# Patient Record
Sex: MALE | Race: BLACK OR AFRICAN AMERICAN | NOT HISPANIC OR LATINO | Employment: FULL TIME | URBAN - METROPOLITAN AREA
[De-identification: names, ages, dates, MRNs, and addresses within clinical notes are randomized per-mention and may not be internally consistent; named-entity substitution may affect disease eponyms.]

---

## 2023-10-04 ENCOUNTER — LAB (OUTPATIENT)
Dept: LAB | Facility: LAB | Age: 48
End: 2023-10-04
Payer: COMMERCIAL

## 2023-10-04 DIAGNOSIS — Z00.00 ENCOUNTER FOR GENERAL ADULT MEDICAL EXAMINATION WITHOUT ABNORMAL FINDINGS: Primary | ICD-10-CM

## 2023-10-04 LAB
25(OH)D3 SERPL-MCNC: 46 NG/ML (ref 30–100)
ALBUMIN SERPL BCP-MCNC: 3.3 G/DL (ref 3.4–5)
ALP SERPL-CCNC: 120 U/L (ref 33–120)
ALT SERPL W P-5'-P-CCNC: 52 U/L (ref 10–52)
ANION GAP SERPL CALC-SCNC: 14 MMOL/L (ref 10–20)
AST SERPL W P-5'-P-CCNC: 91 U/L (ref 9–39)
BASOPHILS # BLD AUTO: 0.04 X10*3/UL (ref 0–0.1)
BASOPHILS NFR BLD AUTO: 1.3 %
BILIRUB SERPL-MCNC: 1.1 MG/DL (ref 0–1.2)
BUN SERPL-MCNC: 10 MG/DL (ref 6–23)
CALCIUM SERPL-MCNC: 8.9 MG/DL (ref 8.6–10.6)
CHLORIDE SERPL-SCNC: 105 MMOL/L (ref 98–107)
CHOLEST SERPL-MCNC: 351 MG/DL (ref 0–199)
CHOLESTEROL/HDL RATIO: 21
CO2 SERPL-SCNC: 26 MMOL/L (ref 21–32)
CREAT SERPL-MCNC: 0.82 MG/DL (ref 0.5–1.3)
EOSINOPHIL # BLD AUTO: 0.04 X10*3/UL (ref 0–0.7)
EOSINOPHIL NFR BLD AUTO: 1.3 %
ERYTHROCYTE [DISTWIDTH] IN BLOOD BY AUTOMATED COUNT: 14.8 % (ref 11.5–14.5)
EST. AVERAGE GLUCOSE BLD GHB EST-MCNC: 91 MG/DL
GFR SERPL CREATININE-BSD FRML MDRD: >90 ML/MIN/1.73M*2
GLUCOSE SERPL-MCNC: 116 MG/DL (ref 74–99)
HBA1C MFR BLD: 4.8 %
HCT VFR BLD AUTO: 34.7 % (ref 41–52)
HDLC SERPL-MCNC: 16.7 MG/DL
HGB BLD-MCNC: 11.7 G/DL (ref 13.5–17.5)
IMM GRANULOCYTES # BLD AUTO: 0.01 X10*3/UL (ref 0–0.7)
IMM GRANULOCYTES NFR BLD AUTO: 0.3 % (ref 0–0.9)
LDLC SERPL CALC-MCNC: ABNORMAL MG/DL
LYMPHOCYTES # BLD AUTO: 1.2 X10*3/UL (ref 1.2–4.8)
LYMPHOCYTES NFR BLD AUTO: 40 %
MCH RBC QN AUTO: 35.1 PG (ref 26–34)
MCHC RBC AUTO-ENTMCNC: 33.7 G/DL (ref 32–36)
MCV RBC AUTO: 104 FL (ref 80–100)
MONOCYTES # BLD AUTO: 0.24 X10*3/UL (ref 0.1–1)
MONOCYTES NFR BLD AUTO: 8 %
NEUTROPHILS # BLD AUTO: 1.47 X10*3/UL (ref 1.2–7.7)
NEUTROPHILS NFR BLD AUTO: 49.1 %
NON HDL CHOLESTEROL: 334 MG/DL (ref 0–149)
NRBC BLD-RTO: 0.7 /100 WBCS (ref 0–0)
PLATELET # BLD AUTO: 108 X10*3/UL (ref 150–450)
PMV BLD AUTO: 11.7 FL (ref 7.5–11.5)
POTASSIUM SERPL-SCNC: 4.1 MMOL/L (ref 3.5–5.3)
PROT SERPL-MCNC: 6.8 G/DL (ref 6.4–8.2)
PSA SERPL-MCNC: 2.48 NG/ML
RBC # BLD AUTO: 3.33 X10*6/UL (ref 4.5–5.9)
SODIUM SERPL-SCNC: 141 MMOL/L (ref 136–145)
TRIGL SERPL-MCNC: 1623 MG/DL (ref 0–149)
TSH SERPL-ACNC: 1.97 MIU/L (ref 0.44–3.98)
VIT B12 SERPL-MCNC: 537 PG/ML (ref 211–911)
VLDL: ABNORMAL
WBC # BLD AUTO: 3 X10*3/UL (ref 4.4–11.3)

## 2023-10-04 PROCEDURE — 82306 VITAMIN D 25 HYDROXY: CPT

## 2023-10-04 PROCEDURE — 83036 HEMOGLOBIN GLYCOSYLATED A1C: CPT

## 2023-10-04 PROCEDURE — 84153 ASSAY OF PSA TOTAL: CPT

## 2023-10-04 PROCEDURE — 80061 LIPID PANEL: CPT

## 2023-10-04 PROCEDURE — 82607 VITAMIN B-12: CPT

## 2023-10-04 PROCEDURE — 36415 COLL VENOUS BLD VENIPUNCTURE: CPT

## 2023-10-04 PROCEDURE — 80050 GENERAL HEALTH PANEL: CPT

## 2024-04-10 ENCOUNTER — HOSPITAL ENCOUNTER (OUTPATIENT)
Dept: RADIOLOGY | Facility: HOSPITAL | Age: 49
Discharge: HOME | End: 2024-04-10
Payer: COMMERCIAL

## 2024-04-10 DIAGNOSIS — R19.00 INTRA-ABDOMINAL AND PELVIC SWELLING, MASS AND LUMP, UNSPECIFIED SITE: ICD-10-CM

## 2024-04-10 PROCEDURE — 76700 US EXAM ABDOM COMPLETE: CPT

## 2024-04-10 PROCEDURE — 76700 US EXAM ABDOM COMPLETE: CPT | Performed by: RADIOLOGY

## 2024-10-06 ENCOUNTER — APPOINTMENT (OUTPATIENT)
Dept: RADIOLOGY | Facility: HOSPITAL | Age: 49
End: 2024-10-06
Payer: COMMERCIAL

## 2024-10-06 ENCOUNTER — HOSPITAL ENCOUNTER (INPATIENT)
Facility: HOSPITAL | Age: 49
LOS: 6 days | Discharge: HOME | End: 2024-10-12
Attending: STUDENT IN AN ORGANIZED HEALTH CARE EDUCATION/TRAINING PROGRAM | Admitting: SURGERY
Payer: COMMERCIAL

## 2024-10-06 DIAGNOSIS — J18.9 PNEUMONIA OF RIGHT UPPER LOBE DUE TO INFECTIOUS ORGANISM: ICD-10-CM

## 2024-10-06 DIAGNOSIS — A41.9 SEPSIS (MULTI): Primary | ICD-10-CM

## 2024-10-06 LAB
ALBUMIN SERPL BCP-MCNC: 3.2 G/DL (ref 3.4–5)
ALP SERPL-CCNC: 105 U/L (ref 33–120)
ALT SERPL W P-5'-P-CCNC: 16 U/L (ref 10–52)
ANION GAP SERPL CALCULATED.3IONS-SCNC: 20 MMOL/L (ref 10–20)
APPEARANCE UR: ABNORMAL
AST SERPL W P-5'-P-CCNC: 43 U/L (ref 9–39)
BASOPHILS # BLD MANUAL: 0.02 X10*3/UL (ref 0–0.1)
BASOPHILS NFR BLD MANUAL: 1 %
BILIRUB SERPL-MCNC: 1.6 MG/DL (ref 0–1.2)
BILIRUB UR STRIP.AUTO-MCNC: NEGATIVE MG/DL
BUN SERPL-MCNC: 12 MG/DL (ref 6–23)
CALCIUM SERPL-MCNC: 8.3 MG/DL (ref 8.6–10.3)
CHLORIDE SERPL-SCNC: 102 MMOL/L (ref 98–107)
CO2 SERPL-SCNC: 16 MMOL/L (ref 21–32)
COLOR UR: YELLOW
CREAT SERPL-MCNC: 0.97 MG/DL (ref 0.5–1.3)
DOHLE BOD BLD QL SMEAR: PRESENT
EGFRCR SERPLBLD CKD-EPI 2021: >90 ML/MIN/1.73M*2
EOSINOPHIL # BLD MANUAL: 0 X10*3/UL (ref 0–0.7)
EOSINOPHIL NFR BLD MANUAL: 0 %
ERYTHROCYTE [DISTWIDTH] IN BLOOD BY AUTOMATED COUNT: 17.1 % (ref 11.5–14.5)
FLUAV RNA RESP QL NAA+PROBE: NOT DETECTED
FLUBV RNA RESP QL NAA+PROBE: NOT DETECTED
GLUCOSE SERPL-MCNC: 60 MG/DL (ref 74–99)
GLUCOSE UR STRIP.AUTO-MCNC: NORMAL MG/DL
HCT VFR BLD AUTO: 34.2 % (ref 41–52)
HGB BLD-MCNC: 11.2 G/DL (ref 13.5–17.5)
IMM GRANULOCYTES # BLD AUTO: 0.01 X10*3/UL (ref 0–0.7)
IMM GRANULOCYTES NFR BLD AUTO: 0.5 % (ref 0–0.9)
KETONES UR STRIP.AUTO-MCNC: NEGATIVE MG/DL
LACTATE SERPL-SCNC: 5.3 MMOL/L (ref 0.4–2)
LACTATE SERPL-SCNC: 8 MMOL/L (ref 0.4–2)
LEUKOCYTE ESTERASE UR QL STRIP.AUTO: NEGATIVE
LYMPHOCYTES # BLD MANUAL: 0.4 X10*3/UL (ref 1.2–4.8)
LYMPHOCYTES NFR BLD MANUAL: 19 %
MCH RBC QN AUTO: 29.1 PG (ref 26–34)
MCHC RBC AUTO-ENTMCNC: 32.7 G/DL (ref 32–36)
MCV RBC AUTO: 89 FL (ref 80–100)
METAMYELOCYTES # BLD MANUAL: 0.02 X10*3/UL
METAMYELOCYTES NFR BLD MANUAL: 1 %
MONOCYTES # BLD MANUAL: 0.11 X10*3/UL (ref 0.1–1)
MONOCYTES NFR BLD MANUAL: 5 %
MUCOUS THREADS #/AREA URNS AUTO: NORMAL /LPF
MYELOCYTES # BLD MANUAL: 0.02 X10*3/UL
MYELOCYTES NFR BLD MANUAL: 1 %
NEUTROPHILS # BLD MANUAL: 1.53 X10*3/UL (ref 1.2–7.7)
NEUTS BAND # BLD MANUAL: 0.02 X10*3/UL (ref 0–0.7)
NEUTS BAND NFR BLD MANUAL: 1 %
NEUTS SEG # BLD MANUAL: 1.51 X10*3/UL (ref 1.2–7)
NEUTS SEG NFR BLD MANUAL: 72 %
NITRITE UR QL STRIP.AUTO: NEGATIVE
NRBC BLD-RTO: 0 /100 WBCS (ref 0–0)
PH UR STRIP.AUTO: 5.5 [PH]
PLATELET # BLD AUTO: 71 X10*3/UL (ref 150–450)
POLYCHROMASIA BLD QL SMEAR: ABNORMAL
POTASSIUM SERPL-SCNC: 4 MMOL/L (ref 3.5–5.3)
PROT SERPL-MCNC: 7.9 G/DL (ref 6.4–8.2)
PROT UR STRIP.AUTO-MCNC: ABNORMAL MG/DL
RBC # BLD AUTO: 3.85 X10*6/UL (ref 4.5–5.9)
RBC # UR STRIP.AUTO: ABNORMAL /UL
RBC #/AREA URNS AUTO: NORMAL /HPF
RBC MORPH BLD: ABNORMAL
SARS-COV-2 RNA RESP QL NAA+PROBE: NOT DETECTED
SODIUM SERPL-SCNC: 134 MMOL/L (ref 136–145)
SP GR UR STRIP.AUTO: 1.02
TOTAL CELLS COUNTED BLD: 100
UROBILINOGEN UR STRIP.AUTO-MCNC: NORMAL MG/DL
WBC # BLD AUTO: 2.1 X10*3/UL (ref 4.4–11.3)
WBC #/AREA URNS AUTO: NORMAL /HPF

## 2024-10-06 PROCEDURE — 85007 BL SMEAR W/DIFF WBC COUNT: CPT | Performed by: PHYSICIAN ASSISTANT

## 2024-10-06 PROCEDURE — 96375 TX/PRO/DX INJ NEW DRUG ADDON: CPT

## 2024-10-06 PROCEDURE — 87040 BLOOD CULTURE FOR BACTERIA: CPT | Mod: WESLAB | Performed by: PHYSICIAN ASSISTANT

## 2024-10-06 PROCEDURE — 71046 X-RAY EXAM CHEST 2 VIEWS: CPT

## 2024-10-06 PROCEDURE — 96367 TX/PROPH/DG ADDL SEQ IV INF: CPT

## 2024-10-06 PROCEDURE — 2500000001 HC RX 250 WO HCPCS SELF ADMINISTERED DRUGS (ALT 637 FOR MEDICARE OP): Performed by: STUDENT IN AN ORGANIZED HEALTH CARE EDUCATION/TRAINING PROGRAM

## 2024-10-06 PROCEDURE — 83605 ASSAY OF LACTIC ACID: CPT | Performed by: PHYSICIAN ASSISTANT

## 2024-10-06 PROCEDURE — 2500000004 HC RX 250 GENERAL PHARMACY W/ HCPCS (ALT 636 FOR OP/ED)

## 2024-10-06 PROCEDURE — 84075 ASSAY ALKALINE PHOSPHATASE: CPT | Performed by: PHYSICIAN ASSISTANT

## 2024-10-06 PROCEDURE — 2500000004 HC RX 250 GENERAL PHARMACY W/ HCPCS (ALT 636 FOR OP/ED): Performed by: PHYSICIAN ASSISTANT

## 2024-10-06 PROCEDURE — 87636 SARSCOV2 & INF A&B AMP PRB: CPT | Performed by: STUDENT IN AN ORGANIZED HEALTH CARE EDUCATION/TRAINING PROGRAM

## 2024-10-06 PROCEDURE — 2060000001 HC INTERMEDIATE ICU ROOM DAILY

## 2024-10-06 PROCEDURE — 96361 HYDRATE IV INFUSION ADD-ON: CPT

## 2024-10-06 PROCEDURE — 96365 THER/PROPH/DIAG IV INF INIT: CPT

## 2024-10-06 PROCEDURE — 71046 X-RAY EXAM CHEST 2 VIEWS: CPT | Performed by: RADIOLOGY

## 2024-10-06 PROCEDURE — 36415 COLL VENOUS BLD VENIPUNCTURE: CPT | Performed by: PHYSICIAN ASSISTANT

## 2024-10-06 PROCEDURE — 85027 COMPLETE CBC AUTOMATED: CPT | Performed by: PHYSICIAN ASSISTANT

## 2024-10-06 PROCEDURE — 81001 URINALYSIS AUTO W/SCOPE: CPT | Performed by: PHYSICIAN ASSISTANT

## 2024-10-06 PROCEDURE — 99291 CRITICAL CARE FIRST HOUR: CPT

## 2024-10-06 RX ORDER — ACETAMINOPHEN 500 MG
1000 TABLET ORAL ONCE
Status: DISCONTINUED | OUTPATIENT
Start: 2024-10-06 | End: 2024-10-07

## 2024-10-06 RX ORDER — ACETAMINOPHEN 325 MG/1
975 TABLET ORAL ONCE
Status: COMPLETED | OUTPATIENT
Start: 2024-10-06 | End: 2024-10-06

## 2024-10-06 RX ORDER — CEFTRIAXONE 2 G/50ML
2 INJECTION, SOLUTION INTRAVENOUS ONCE
Status: COMPLETED | OUTPATIENT
Start: 2024-10-06 | End: 2024-10-06

## 2024-10-06 RX ORDER — KETOROLAC TROMETHAMINE 30 MG/ML
15 INJECTION, SOLUTION INTRAMUSCULAR; INTRAVENOUS ONCE
Status: COMPLETED | OUTPATIENT
Start: 2024-10-06 | End: 2024-10-06

## 2024-10-06 ASSESSMENT — COLUMBIA-SUICIDE SEVERITY RATING SCALE - C-SSRS
2. HAVE YOU ACTUALLY HAD ANY THOUGHTS OF KILLING YOURSELF?: NO
1. IN THE PAST MONTH, HAVE YOU WISHED YOU WERE DEAD OR WISHED YOU COULD GO TO SLEEP AND NOT WAKE UP?: NO
6. HAVE YOU EVER DONE ANYTHING, STARTED TO DO ANYTHING, OR PREPARED TO DO ANYTHING TO END YOUR LIFE?: NO

## 2024-10-06 ASSESSMENT — PAIN SCALES - GENERAL
PAINLEVEL_OUTOF10: 8
PAINLEVEL_OUTOF10: 8

## 2024-10-06 ASSESSMENT — PAIN DESCRIPTION - LOCATION: LOCATION: GENERALIZED

## 2024-10-07 ENCOUNTER — APPOINTMENT (OUTPATIENT)
Dept: RADIOLOGY | Facility: HOSPITAL | Age: 49
End: 2024-10-07
Payer: COMMERCIAL

## 2024-10-07 LAB
ALBUMIN SERPL BCP-MCNC: 2.8 G/DL (ref 3.4–5)
ALP SERPL-CCNC: 67 U/L (ref 33–120)
ALT SERPL W P-5'-P-CCNC: 16 U/L (ref 10–52)
ANION GAP BLDV CALCULATED.4IONS-SCNC: 10 MMOL/L (ref 10–25)
ANION GAP BLDV CALCULATED.4IONS-SCNC: 13 MMOL/L (ref 10–25)
ANION GAP BLDV CALCULATED.4IONS-SCNC: 14 MMOL/L (ref 10–25)
ANION GAP SERPL CALCULATED.3IONS-SCNC: 14 MMOL/L (ref 10–20)
ARTERIAL PATENCY WRIST A: POSITIVE
AST SERPL W P-5'-P-CCNC: 43 U/L (ref 9–39)
BACTERIA SPEC RESP CULT: ABNORMAL
BASE EXCESS BLDA CALC-SCNC: -7 MMOL/L (ref -2–3)
BASE EXCESS BLDV CALC-SCNC: -1.3 MMOL/L (ref -2–3)
BASE EXCESS BLDV CALC-SCNC: -3.7 MMOL/L (ref -2–3)
BASE EXCESS BLDV CALC-SCNC: -6.5 MMOL/L (ref -2–3)
BASOPHILS # BLD MANUAL: 0.09 X10*3/UL (ref 0–0.1)
BASOPHILS NFR BLD MANUAL: 1 %
BILIRUB SERPL-MCNC: 1.5 MG/DL (ref 0–1.2)
BODY TEMPERATURE: 37 DEGREES CELSIUS
BUN SERPL-MCNC: 15 MG/DL (ref 6–23)
CA-I BLDV-SCNC: 1.07 MMOL/L (ref 1.1–1.33)
CA-I BLDV-SCNC: 1.12 MMOL/L (ref 1.1–1.33)
CA-I BLDV-SCNC: 1.14 MMOL/L (ref 1.1–1.33)
CALCIUM SERPL-MCNC: 7.8 MG/DL (ref 8.6–10.3)
CARDIAC TROPONIN I PNL SERPL HS: 17 NG/L (ref 0–20)
CHLORIDE BLDV-SCNC: 103 MMOL/L (ref 98–107)
CHLORIDE SERPL-SCNC: 103 MMOL/L (ref 98–107)
CO2 SERPL-SCNC: 20 MMOL/L (ref 21–32)
COHGB MFR BLDA: ABNORMAL %
CREAT SERPL-MCNC: 1.13 MG/DL (ref 0.5–1.3)
DO-HGB MFR BLDA: ABNORMAL %
DOHLE BOD BLD QL SMEAR: PRESENT
EGFRCR SERPLBLD CKD-EPI 2021: 80 ML/MIN/1.73M*2
EOSINOPHIL # BLD MANUAL: 0 X10*3/UL (ref 0–0.7)
EOSINOPHIL NFR BLD MANUAL: 0 %
ERYTHROCYTE [DISTWIDTH] IN BLOOD BY AUTOMATED COUNT: 16.9 % (ref 11.5–14.5)
GLUCOSE BLDV-MCNC: 127 MG/DL (ref 74–99)
GLUCOSE BLDV-MCNC: 128 MG/DL (ref 74–99)
GLUCOSE BLDV-MCNC: 142 MG/DL (ref 74–99)
GLUCOSE SERPL-MCNC: 131 MG/DL (ref 74–99)
GRAM STN SPEC: ABNORMAL
HCO3 BLDA-SCNC: 16.9 MMOL/L (ref 22–26)
HCO3 BLDV-SCNC: 17.1 MMOL/L (ref 22–26)
HCO3 BLDV-SCNC: 20.3 MMOL/L (ref 22–26)
HCO3 BLDV-SCNC: 22.6 MMOL/L (ref 22–26)
HCT VFR BLD AUTO: 31.9 % (ref 41–52)
HCT VFR BLD EST: 29 % (ref 41–52)
HCT VFR BLD EST: 31 % (ref 41–52)
HCT VFR BLD EST: 32 % (ref 41–52)
HGB BLD-MCNC: 10 G/DL (ref 13.5–17.5)
HGB BLDA-MCNC: 10.2 G/DL (ref 13.5–17.5)
HGB BLDV-MCNC: 10.3 G/DL (ref 13.5–17.5)
HGB BLDV-MCNC: 10.6 G/DL (ref 13.5–17.5)
HGB BLDV-MCNC: 9.8 G/DL (ref 13.5–17.5)
HOLD SPECIMEN: NORMAL
IMM GRANULOCYTES # BLD AUTO: 0.4 X10*3/UL (ref 0–0.7)
IMM GRANULOCYTES NFR BLD AUTO: 4.5 % (ref 0–0.9)
INHALED O2 CONCENTRATION: 0 %
INHALED O2 CONCENTRATION: 21 %
INHALED O2 CONCENTRATION: 21 %
INHALED O2 CONCENTRATION: 28 %
LACTATE BLDV-SCNC: 4.3 MMOL/L (ref 0.4–2)
LACTATE BLDV-SCNC: 6.7 MMOL/L (ref 0.4–2)
LACTATE SERPL-SCNC: 7.5 MMOL/L (ref 0.4–2)
LYMPHOCYTES # BLD MANUAL: 0.8 X10*3/UL (ref 1.2–4.8)
LYMPHOCYTES NFR BLD MANUAL: 9 %
MAGNESIUM SERPL-MCNC: 0.98 MG/DL (ref 1.6–2.4)
MAGNESIUM SERPL-MCNC: 2.19 MG/DL (ref 1.6–2.4)
MCH RBC QN AUTO: 28.7 PG (ref 26–34)
MCHC RBC AUTO-ENTMCNC: 31.3 G/DL (ref 32–36)
MCV RBC AUTO: 91 FL (ref 80–100)
METAMYELOCYTES # BLD MANUAL: 0.36 X10*3/UL
METAMYELOCYTES NFR BLD MANUAL: 4 %
METHGB MFR BLDA: ABNORMAL %
MONOCYTES # BLD MANUAL: 0.45 X10*3/UL (ref 0.1–1)
MONOCYTES NFR BLD MANUAL: 5 %
NEUTROPHILS # BLD MANUAL: 7.21 X10*3/UL (ref 1.2–7.7)
NEUTS BAND # BLD MANUAL: 2.23 X10*3/UL (ref 0–0.7)
NEUTS BAND NFR BLD MANUAL: 25 %
NEUTS SEG # BLD MANUAL: 4.98 X10*3/UL (ref 1.2–7)
NEUTS SEG NFR BLD MANUAL: 56 %
NRBC BLD-RTO: 0 /100 WBCS (ref 0–0)
OXYHGB MFR BLDA: 79.9 % (ref 94–98)
OXYHGB MFR BLDV: 71.1 % (ref 45–75)
OXYHGB MFR BLDV: 74.1 % (ref 45–75)
OXYHGB MFR BLDV: 86.6 % (ref 45–75)
PCO2 BLDA: 28 MM HG (ref 38–42)
PCO2 BLDV: 27 MM HG (ref 41–51)
PCO2 BLDV: 32 MM HG (ref 41–51)
PCO2 BLDV: 34 MM HG (ref 41–51)
PH BLDA: 7.39 PH (ref 7.38–7.42)
PH BLDV: 7.41 PH (ref 7.33–7.43)
PH BLDV: 7.41 PH (ref 7.33–7.43)
PH BLDV: 7.43 PH (ref 7.33–7.43)
PHOSPHATE SERPL-MCNC: 2.5 MG/DL (ref 2.5–4.9)
PLATELET # BLD AUTO: 56 X10*3/UL (ref 150–450)
PO2 BLDA: 52 MM HG (ref 85–95)
PO2 BLDV: 45 MM HG (ref 35–45)
PO2 BLDV: 48 MM HG (ref 35–45)
PO2 BLDV: 60 MM HG (ref 35–45)
POLYCHROMASIA BLD QL SMEAR: ABNORMAL
POTASSIUM BLDV-SCNC: 4.2 MMOL/L (ref 3.5–5.3)
POTASSIUM BLDV-SCNC: 4.2 MMOL/L (ref 3.5–5.3)
POTASSIUM BLDV-SCNC: 5.2 MMOL/L (ref 3.5–5.3)
POTASSIUM SERPL-SCNC: 4.7 MMOL/L (ref 3.5–5.3)
PROCALCITONIN SERPL-MCNC: 43.27 NG/ML
PROT SERPL-MCNC: 6.9 G/DL (ref 6.4–8.2)
RBC # BLD AUTO: 3.49 X10*6/UL (ref 4.5–5.9)
RBC MORPH BLD: ABNORMAL
SAO2 % BLDA: 82 % (ref 94–100)
SAO2 % BLDV: 73 % (ref 45–75)
SAO2 % BLDV: 76 % (ref 45–75)
SAO2 % BLDV: 89 % (ref 45–75)
SODIUM BLDV-SCNC: 130 MMOL/L (ref 136–145)
SODIUM BLDV-SCNC: 131 MMOL/L (ref 136–145)
SODIUM BLDV-SCNC: 131 MMOL/L (ref 136–145)
SODIUM SERPL-SCNC: 132 MMOL/L (ref 136–145)
SPECIMEN DRAWN FROM PATIENT: ABNORMAL
TOTAL CELLS COUNTED BLD: 100
WBC # BLD AUTO: 8.9 X10*3/UL (ref 4.4–11.3)

## 2024-10-07 PROCEDURE — 74176 CT ABD & PELVIS W/O CONTRAST: CPT

## 2024-10-07 PROCEDURE — 76705 ECHO EXAM OF ABDOMEN: CPT | Performed by: RADIOLOGY

## 2024-10-07 PROCEDURE — 85027 COMPLETE CBC AUTOMATED: CPT

## 2024-10-07 PROCEDURE — 99292 CRITICAL CARE ADDL 30 MIN: CPT

## 2024-10-07 PROCEDURE — 87205 SMEAR GRAM STAIN: CPT | Mod: WESLAB

## 2024-10-07 PROCEDURE — 36600 WITHDRAWAL OF ARTERIAL BLOOD: CPT

## 2024-10-07 PROCEDURE — 36415 COLL VENOUS BLD VENIPUNCTURE: CPT | Performed by: PHYSICIAN ASSISTANT

## 2024-10-07 PROCEDURE — 2500000004 HC RX 250 GENERAL PHARMACY W/ HCPCS (ALT 636 FOR OP/ED)

## 2024-10-07 PROCEDURE — 82375 ASSAY CARBOXYHB QUANT: CPT

## 2024-10-07 PROCEDURE — 84075 ASSAY ALKALINE PHOSPHATASE: CPT

## 2024-10-07 PROCEDURE — 82435 ASSAY OF BLOOD CHLORIDE: CPT

## 2024-10-07 PROCEDURE — 71275 CT ANGIOGRAPHY CHEST: CPT | Performed by: RADIOLOGY

## 2024-10-07 PROCEDURE — 83605 ASSAY OF LACTIC ACID: CPT

## 2024-10-07 PROCEDURE — 71275 CT ANGIOGRAPHY CHEST: CPT

## 2024-10-07 PROCEDURE — 2500000001 HC RX 250 WO HCPCS SELF ADMINISTERED DRUGS (ALT 637 FOR MEDICARE OP)

## 2024-10-07 PROCEDURE — 83735 ASSAY OF MAGNESIUM: CPT

## 2024-10-07 PROCEDURE — 83605 ASSAY OF LACTIC ACID: CPT | Performed by: PHYSICIAN ASSISTANT

## 2024-10-07 PROCEDURE — 99291 CRITICAL CARE FIRST HOUR: CPT

## 2024-10-07 PROCEDURE — 2060000001 HC INTERMEDIATE ICU ROOM DAILY

## 2024-10-07 PROCEDURE — 94760 N-INVAS EAR/PLS OXIMETRY 1: CPT

## 2024-10-07 PROCEDURE — 2500000002 HC RX 250 W HCPCS SELF ADMINISTERED DRUGS (ALT 637 FOR MEDICARE OP, ALT 636 FOR OP/ED)

## 2024-10-07 PROCEDURE — 84100 ASSAY OF PHOSPHORUS: CPT

## 2024-10-07 PROCEDURE — 74176 CT ABD & PELVIS W/O CONTRAST: CPT | Performed by: RADIOLOGY

## 2024-10-07 PROCEDURE — 2550000001 HC RX 255 CONTRASTS: Performed by: STUDENT IN AN ORGANIZED HEALTH CARE EDUCATION/TRAINING PROGRAM

## 2024-10-07 PROCEDURE — 85007 BL SMEAR W/DIFF WBC COUNT: CPT

## 2024-10-07 PROCEDURE — 76705 ECHO EXAM OF ABDOMEN: CPT

## 2024-10-07 PROCEDURE — 84484 ASSAY OF TROPONIN QUANT: CPT

## 2024-10-07 PROCEDURE — 36415 COLL VENOUS BLD VENIPUNCTURE: CPT

## 2024-10-07 PROCEDURE — 2500000004 HC RX 250 GENERAL PHARMACY W/ HCPCS (ALT 636 FOR OP/ED): Performed by: INTERNAL MEDICINE

## 2024-10-07 PROCEDURE — 84145 PROCALCITONIN (PCT): CPT | Mod: WESLAB

## 2024-10-07 RX ORDER — AZITHROMYCIN 250 MG/1
500 TABLET, FILM COATED ORAL
Status: DISCONTINUED | OUTPATIENT
Start: 2024-10-07 | End: 2024-10-07

## 2024-10-07 RX ORDER — THIAMINE HYDROCHLORIDE 100 MG/ML
100 INJECTION, SOLUTION INTRAMUSCULAR; INTRAVENOUS DAILY
Status: COMPLETED | OUTPATIENT
Start: 2024-10-07 | End: 2024-10-09

## 2024-10-07 RX ORDER — ONDANSETRON 4 MG/1
4 TABLET, ORALLY DISINTEGRATING ORAL EVERY 8 HOURS PRN
Status: DISCONTINUED | OUTPATIENT
Start: 2024-10-07 | End: 2024-10-07

## 2024-10-07 RX ORDER — LOPERAMIDE HYDROCHLORIDE 2 MG/1
2 CAPSULE ORAL 4 TIMES DAILY PRN
Status: DISCONTINUED | OUTPATIENT
Start: 2024-10-07 | End: 2024-10-12 | Stop reason: HOSPADM

## 2024-10-07 RX ORDER — ONDANSETRON HYDROCHLORIDE 2 MG/ML
4 INJECTION, SOLUTION INTRAVENOUS EVERY 8 HOURS PRN
Status: DISCONTINUED | OUTPATIENT
Start: 2024-10-07 | End: 2024-10-07

## 2024-10-07 RX ORDER — LANOLIN ALCOHOL/MO/W.PET/CERES
100 CREAM (GRAM) TOPICAL DAILY
Status: DISCONTINUED | OUTPATIENT
Start: 2024-10-10 | End: 2024-10-12 | Stop reason: HOSPADM

## 2024-10-07 RX ORDER — PHENOBARBITAL 32.4 MG/1
64.8 TABLET ORAL 3 TIMES DAILY
Status: COMPLETED | OUTPATIENT
Start: 2024-10-07 | End: 2024-10-08

## 2024-10-07 RX ORDER — CEFTRIAXONE 1 G/50ML
1 INJECTION, SOLUTION INTRAVENOUS EVERY 24 HOURS
Status: COMPLETED | OUTPATIENT
Start: 2024-10-08 | End: 2024-10-12

## 2024-10-07 RX ORDER — FOLIC ACID 1 MG/1
1 TABLET ORAL DAILY
Status: DISCONTINUED | OUTPATIENT
Start: 2024-10-07 | End: 2024-10-12 | Stop reason: HOSPADM

## 2024-10-07 RX ORDER — MULTIVIT-MIN/IRON FUM/FOLIC AC 7.5 MG-4
1 TABLET ORAL DAILY
Status: DISCONTINUED | OUTPATIENT
Start: 2024-10-07 | End: 2024-10-12 | Stop reason: HOSPADM

## 2024-10-07 RX ORDER — ACETAMINOPHEN 160 MG/5ML
650 SOLUTION ORAL EVERY 4 HOURS PRN
Status: DISCONTINUED | OUTPATIENT
Start: 2024-10-07 | End: 2024-10-07

## 2024-10-07 RX ORDER — ENOXAPARIN SODIUM 100 MG/ML
40 INJECTION SUBCUTANEOUS DAILY
Status: DISCONTINUED | OUTPATIENT
Start: 2024-10-07 | End: 2024-10-07

## 2024-10-07 RX ORDER — PHENOBARBITAL 32.4 MG/1
32.4 TABLET ORAL 3 TIMES DAILY
Status: DISPENSED | OUTPATIENT
Start: 2024-10-09 | End: 2024-10-11

## 2024-10-07 RX ORDER — MAGNESIUM SULFATE 4 G/50ML
4 INJECTION INTRAVENOUS ONCE
Status: COMPLETED | OUTPATIENT
Start: 2024-10-07 | End: 2024-10-07

## 2024-10-07 RX ORDER — ACETAMINOPHEN 325 MG/1
650 TABLET ORAL EVERY 4 HOURS PRN
Status: DISCONTINUED | OUTPATIENT
Start: 2024-10-07 | End: 2024-10-07

## 2024-10-07 RX ORDER — ACETAMINOPHEN 325 MG/1
650 TABLET ORAL EVERY 4 HOURS PRN
Status: DISCONTINUED | OUTPATIENT
Start: 2024-10-07 | End: 2024-10-12 | Stop reason: HOSPADM

## 2024-10-07 RX ORDER — ONDANSETRON HYDROCHLORIDE 2 MG/ML
4 INJECTION, SOLUTION INTRAVENOUS EVERY 4 HOURS PRN
Status: DISCONTINUED | OUTPATIENT
Start: 2024-10-07 | End: 2024-10-10 | Stop reason: SDUPTHER

## 2024-10-07 RX ORDER — ACETAMINOPHEN 160 MG/5ML
650 SOLUTION ORAL EVERY 6 HOURS PRN
Status: DISCONTINUED | OUTPATIENT
Start: 2024-10-07 | End: 2024-10-12 | Stop reason: HOSPADM

## 2024-10-07 RX ORDER — AZITHROMYCIN 250 MG/1
250 TABLET, FILM COATED ORAL DAILY
Status: DISCONTINUED | OUTPATIENT
Start: 2024-10-14 | End: 2024-10-07

## 2024-10-07 RX ORDER — SODIUM CHLORIDE 9 MG/ML
75 INJECTION, SOLUTION INTRAVENOUS CONTINUOUS
Status: DISCONTINUED | OUTPATIENT
Start: 2024-10-07 | End: 2024-10-12 | Stop reason: HOSPADM

## 2024-10-07 RX ORDER — CEFTRIAXONE 1 G/50ML
1 INJECTION, SOLUTION INTRAVENOUS EVERY 12 HOURS
Status: DISCONTINUED | OUTPATIENT
Start: 2024-10-07 | End: 2024-10-07

## 2024-10-07 RX ORDER — AZITHROMYCIN 500 MG/1
500 TABLET, FILM COATED ORAL
Status: COMPLETED | OUTPATIENT
Start: 2024-10-07 | End: 2024-10-12

## 2024-10-07 SDOH — ECONOMIC STABILITY: INCOME INSECURITY: IN THE LAST 12 MONTHS, WAS THERE A TIME WHEN YOU WERE NOT ABLE TO PAY THE MORTGAGE OR RENT ON TIME?: NO

## 2024-10-07 SDOH — HEALTH STABILITY: PHYSICAL HEALTH: ON AVERAGE, HOW MANY DAYS PER WEEK DO YOU ENGAGE IN MODERATE TO STRENUOUS EXERCISE (LIKE A BRISK WALK)?: 5 DAYS

## 2024-10-07 SDOH — SOCIAL STABILITY: SOCIAL INSECURITY: WITHIN THE LAST YEAR, HAVE YOU BEEN AFRAID OF YOUR PARTNER OR EX-PARTNER?: NO

## 2024-10-07 SDOH — ECONOMIC STABILITY: FOOD INSECURITY: WITHIN THE PAST 12 MONTHS, THE FOOD YOU BOUGHT JUST DIDN'T LAST AND YOU DIDN'T HAVE MONEY TO GET MORE.: NEVER TRUE

## 2024-10-07 SDOH — HEALTH STABILITY: PHYSICAL HEALTH: ON AVERAGE, HOW MANY MINUTES DO YOU ENGAGE IN EXERCISE AT THIS LEVEL?: 30 MIN

## 2024-10-07 SDOH — ECONOMIC STABILITY: FOOD INSECURITY: WITHIN THE PAST 12 MONTHS, YOU WORRIED THAT YOUR FOOD WOULD RUN OUT BEFORE YOU GOT THE MONEY TO BUY MORE.: NEVER TRUE

## 2024-10-07 SDOH — SOCIAL STABILITY: SOCIAL INSECURITY
WITHIN THE LAST YEAR, HAVE TO BEEN RAPED OR FORCED TO HAVE ANY KIND OF SEXUAL ACTIVITY BY YOUR PARTNER OR EX-PARTNER?: NO

## 2024-10-07 SDOH — ECONOMIC STABILITY: FOOD INSECURITY: WITHIN THE PAST 12 MONTHS, YOU WORRIED THAT YOUR FOOD WOULD RUN OUT BEFORE YOU GOT MONEY TO BUY MORE.: NEVER TRUE

## 2024-10-07 SDOH — ECONOMIC STABILITY: INCOME INSECURITY: IN THE PAST 12 MONTHS HAS THE ELECTRIC, GAS, OIL, OR WATER COMPANY THREATENED TO SHUT OFF SERVICES IN YOUR HOME?: NO

## 2024-10-07 SDOH — ECONOMIC STABILITY: HOUSING INSECURITY: AT ANY TIME IN THE PAST 12 MONTHS, WERE YOU HOMELESS OR LIVING IN A SHELTER (INCLUDING NOW)?: NO

## 2024-10-07 SDOH — HEALTH STABILITY: MENTAL HEALTH: HOW OFTEN DO YOU HAVE SIX OR MORE DRINKS ON ONE OCCASION?: DAILY OR ALMOST DAILY

## 2024-10-07 SDOH — SOCIAL STABILITY: SOCIAL INSECURITY: WITHIN THE LAST YEAR, HAVE YOU BEEN HUMILIATED OR EMOTIONALLY ABUSED IN OTHER WAYS BY YOUR PARTNER OR EX-PARTNER?: NO

## 2024-10-07 SDOH — SOCIAL STABILITY: SOCIAL INSECURITY: ARE YOU MARRIED, WIDOWED, DIVORCED, SEPARATED, NEVER MARRIED, OR LIVING WITH A PARTNER?: LIVING WITH PARTNER

## 2024-10-07 SDOH — ECONOMIC STABILITY: INCOME INSECURITY: HOW HARD IS IT FOR YOU TO PAY FOR THE VERY BASICS LIKE FOOD, HOUSING, MEDICAL CARE, AND HEATING?: NOT VERY HARD

## 2024-10-07 SDOH — SOCIAL STABILITY: SOCIAL NETWORK
DO YOU BELONG TO ANY CLUBS OR ORGANIZATIONS SUCH AS CHURCH GROUPS UNIONS, FRATERNAL OR ATHLETIC GROUPS, OR SCHOOL GROUPS?: NO

## 2024-10-07 SDOH — SOCIAL STABILITY: SOCIAL INSECURITY
WITHIN THE LAST YEAR, HAVE YOU BEEN KICKED, HIT, SLAPPED, OR OTHERWISE PHYSICALLY HURT BY YOUR PARTNER OR EX-PARTNER?: NO

## 2024-10-07 SDOH — SOCIAL STABILITY: SOCIAL INSECURITY
WITHIN THE LAST YEAR, HAVE YOU BEEN RAPED OR FORCED TO HAVE ANY KIND OF SEXUAL ACTIVITY BY YOUR PARTNER OR EX-PARTNER?: NO

## 2024-10-07 SDOH — HEALTH STABILITY: MENTAL HEALTH: HOW OFTEN DO YOU HAVE 6 OR MORE DRINKS ON ONE OCCASION?: DAILY OR ALMOST DAILY

## 2024-10-07 SDOH — ECONOMIC STABILITY: HOUSING INSECURITY: IN THE LAST 12 MONTHS, WAS THERE A TIME WHEN YOU WERE NOT ABLE TO PAY THE MORTGAGE OR RENT ON TIME?: NO

## 2024-10-07 SDOH — HEALTH STABILITY: PHYSICAL HEALTH
HOW OFTEN DO YOU NEED TO HAVE SOMEONE HELP YOU WHEN YOU READ INSTRUCTIONS, PAMPHLETS, OR OTHER WRITTEN MATERIAL FROM YOUR DOCTOR OR PHARMACY?: NEVER

## 2024-10-07 SDOH — SOCIAL STABILITY: SOCIAL NETWORK: HOW OFTEN DO YOU ATTEND CHURCH OR RELIGIOUS SERVICES?: NEVER

## 2024-10-07 SDOH — ECONOMIC STABILITY: INCOME INSECURITY: IN THE PAST 12 MONTHS, HAS THE ELECTRIC, GAS, OIL, OR WATER COMPANY THREATENED TO SHUT OFF SERVICE IN YOUR HOME?: NO

## 2024-10-07 SDOH — HEALTH STABILITY: MENTAL HEALTH: HOW MANY DRINKS CONTAINING ALCOHOL DO YOU HAVE ON A TYPICAL DAY WHEN YOU ARE DRINKING?: 7 TO 9

## 2024-10-07 SDOH — ECONOMIC STABILITY: INCOME INSECURITY: HOW HARD IS IT FOR YOU TO PAY FOR THE VERY BASICS LIKE FOOD, HOUSING, MEDICAL CARE, AND HEATING?: NOT HARD AT ALL

## 2024-10-07 SDOH — SOCIAL STABILITY: SOCIAL NETWORK: ARE YOU MARRIED, WIDOWED, DIVORCED, SEPARATED, NEVER MARRIED, OR LIVING WITH A PARTNER?: LIVING WITH PARTNER

## 2024-10-07 SDOH — HEALTH STABILITY: MENTAL HEALTH: HOW OFTEN DO YOU HAVE A DRINK CONTAINING ALCOHOL?: 4 OR MORE TIMES A WEEK

## 2024-10-07 SDOH — SOCIAL STABILITY: SOCIAL NETWORK: HOW OFTEN DO YOU ATTEND MEETINGS OF THE CLUBS OR ORGANIZATIONS YOU BELONG TO?: NEVER

## 2024-10-07 SDOH — HEALTH STABILITY: MENTAL HEALTH
DO YOU FEEL STRESS - TENSE, RESTLESS, NERVOUS, OR ANXIOUS, OR UNABLE TO SLEEP AT NIGHT BECAUSE YOUR MIND IS TROUBLED ALL THE TIME - THESE DAYS?: NOT AT ALL

## 2024-10-07 SDOH — SOCIAL STABILITY: SOCIAL INSECURITY: DO YOU FEEL ANYONE HAS EXPLOITED OR TAKEN ADVANTAGE OF YOU FINANCIALLY OR OF YOUR PERSONAL PROPERTY?: NO

## 2024-10-07 SDOH — SOCIAL STABILITY: SOCIAL INSECURITY: ABUSE: ADULT

## 2024-10-07 SDOH — ECONOMIC STABILITY: TRANSPORTATION INSECURITY
IN THE PAST 12 MONTHS, HAS LACK OF TRANSPORTATION KEPT YOU FROM MEETINGS, WORK, OR FROM GETTING THINGS NEEDED FOR DAILY LIVING?: NO

## 2024-10-07 SDOH — SOCIAL STABILITY: SOCIAL INSECURITY: ARE THERE ANY APPARENT SIGNS OF INJURIES/BEHAVIORS THAT COULD BE RELATED TO ABUSE/NEGLECT?: NO

## 2024-10-07 SDOH — SOCIAL STABILITY: SOCIAL NETWORK: HOW OFTEN DO YOU ATTENT MEETINGS OF THE CLUB OR ORGANIZATION YOU BELONG TO?: NEVER

## 2024-10-07 SDOH — SOCIAL STABILITY: SOCIAL NETWORK: IN A TYPICAL WEEK, HOW MANY TIMES DO YOU TALK ON THE PHONE WITH FAMILY, FRIENDS, OR NEIGHBORS?: TWICE A WEEK

## 2024-10-07 SDOH — HEALTH STABILITY: MENTAL HEALTH: HOW MANY STANDARD DRINKS CONTAINING ALCOHOL DO YOU HAVE ON A TYPICAL DAY?: 7 TO 9

## 2024-10-07 SDOH — HEALTH STABILITY: MENTAL HEALTH
STRESS IS WHEN SOMEONE FEELS TENSE, NERVOUS, ANXIOUS, OR CAN'T SLEEP AT NIGHT BECAUSE THEIR MIND IS TROUBLED. HOW STRESSED ARE YOU?: NOT AT ALL

## 2024-10-07 SDOH — ECONOMIC STABILITY: TRANSPORTATION INSECURITY: IN THE PAST 12 MONTHS, HAS LACK OF TRANSPORTATION KEPT YOU FROM MEDICAL APPOINTMENTS OR FROM GETTING MEDICATIONS?: NO

## 2024-10-07 SDOH — SOCIAL STABILITY: SOCIAL INSECURITY: ARE YOU OR HAVE YOU BEEN THREATENED OR ABUSED PHYSICALLY, EMOTIONALLY, OR SEXUALLY BY ANYONE?: NO

## 2024-10-07 SDOH — ECONOMIC STABILITY: FOOD INSECURITY: HOW HARD IS IT FOR YOU TO PAY FOR THE VERY BASICS LIKE FOOD, HOUSING, MEDICAL CARE, AND HEATING?: NOT VERY HARD

## 2024-10-07 SDOH — SOCIAL STABILITY: SOCIAL NETWORK
DO YOU BELONG TO ANY CLUBS OR ORGANIZATIONS SUCH AS CHURCH GROUPS, UNIONS, FRATERNAL OR ATHLETIC GROUPS, OR SCHOOL GROUPS?: NO

## 2024-10-07 SDOH — ECONOMIC STABILITY: HOUSING INSECURITY: IN THE PAST 12 MONTHS, HOW MANY TIMES HAVE YOU MOVED WHERE YOU WERE LIVING?: 0

## 2024-10-07 SDOH — SOCIAL STABILITY: SOCIAL INSECURITY: HAVE YOU HAD THOUGHTS OF HARMING ANYONE ELSE?: NO

## 2024-10-07 SDOH — SOCIAL STABILITY: SOCIAL INSECURITY: DO YOU FEEL UNSAFE GOING BACK TO THE PLACE WHERE YOU ARE LIVING?: NO

## 2024-10-07 SDOH — SOCIAL STABILITY: SOCIAL NETWORK: HOW OFTEN DO YOU GET TOGETHER WITH FRIENDS OR RELATIVES?: TWICE A WEEK

## 2024-10-07 SDOH — SOCIAL STABILITY: SOCIAL INSECURITY: DOES ANYONE TRY TO KEEP YOU FROM HAVING/CONTACTING OTHER FRIENDS OR DOING THINGS OUTSIDE YOUR HOME?: NO

## 2024-10-07 SDOH — SOCIAL STABILITY: SOCIAL INSECURITY: HAS ANYONE EVER THREATENED TO HURT YOUR FAMILY OR YOUR PETS?: NO

## 2024-10-07 SDOH — ECONOMIC STABILITY: TRANSPORTATION INSECURITY
IN THE PAST 12 MONTHS, HAS THE LACK OF TRANSPORTATION KEPT YOU FROM MEDICAL APPOINTMENTS OR FROM GETTING MEDICATIONS?: NO

## 2024-10-07 SDOH — ECONOMIC STABILITY: HOUSING INSECURITY: IN THE PAST 12 MONTHS, HOW MANY TIMES HAVE YOU MOVED WHERE YOU WERE LIVING?: 1

## 2024-10-07 SDOH — ECONOMIC STABILITY: FOOD INSECURITY: HOW HARD IS IT FOR YOU TO PAY FOR THE VERY BASICS LIKE FOOD, HOUSING, MEDICAL CARE, AND HEATING?: NOT HARD AT ALL

## 2024-10-07 SDOH — SOCIAL STABILITY: SOCIAL INSECURITY: WERE YOU ABLE TO COMPLETE ALL THE BEHAVIORAL HEALTH SCREENINGS?: YES

## 2024-10-07 ASSESSMENT — LIFESTYLE VARIABLES
VISUAL DISTURBANCES: NOT PRESENT
AGITATION: NORMAL ACTIVITY
NAUSEA AND VOMITING: NO NAUSEA AND NO VOMITING
HOW OFTEN DURING THE LAST YEAR HAVE YOU BEEN UNABLE TO REMEMBER WHAT HAPPENED THE NIGHT BEFORE BECAUSE YOU HAD BEEN DRINKING: NEVER
PAROXYSMAL SWEATS: NO SWEAT VISIBLE
BLOOD PRESSURE: 125/71
VISUAL DISTURBANCES: NOT PRESENT
TREMOR: NOT VISIBLE, BUT CAN BE FELT FINGERTIP TO FINGERTIP
HEADACHE, FULLNESS IN HEAD: NOT PRESENT
HOW OFTEN DO YOU HAVE A DRINK CONTAINING ALCOHOL: 4 OR MORE TIMES A WEEK
TOTAL SCORE: 1
ANXIETY: NO ANXIETY, AT EASE
AUDITORY DISTURBANCES: NOT PRESENT
AUDITORY DISTURBANCES: NOT PRESENT
AUDIT-C TOTAL SCORE: 10
PAROXYSMAL SWEATS: NO SWEAT VISIBLE
TREMOR: NOT VISIBLE, BUT CAN BE FELT FINGERTIP TO FINGERTIP
AGITATION: NORMAL ACTIVITY
BLOOD PRESSURE: 105/68
AGITATION: NORMAL ACTIVITY
HOW OFTEN DURING THE LAST YEAR HAVE YOU FAILED TO DO WHAT WAS NORMALLY EXPECTED FROM YOU BECAUSE OF DRINKING: NEVER
HEADACHE, FULLNESS IN HEAD: NOT PRESENT
HOW OFTEN DURING THE LAST YEAR HAVE YOU FOUND THAT YOU WERE NOT ABLE TO STOP DRINKING ONCE YOU HAD STARTED: NEVER
HEADACHE, FULLNESS IN HEAD: MILD
TOTAL SCORE: 1
HOW OFTEN DO YOU HAVE 6 OR MORE DRINKS ON ONE OCCASION: WEEKLY
HAVE YOU OR SOMEONE ELSE BEEN INJURED AS A RESULT OF YOUR DRINKING: NO
HEADACHE, FULLNESS IN HEAD: MILD
AGITATION: NORMAL ACTIVITY
TREMOR: NO TREMOR
AUDITORY DISTURBANCES: NOT PRESENT
SKIP TO QUESTIONS 9-10: 0
ORIENTATION AND CLOUDING OF SENSORIUM: ORIENTED AND CAN DO SERIAL ADDITIONS
PAROXYSMAL SWEATS: NO SWEAT VISIBLE
AUDITORY DISTURBANCES: NOT PRESENT
NAUSEA AND VOMITING: NO NAUSEA AND NO VOMITING
VISUAL DISTURBANCES: NOT PRESENT
PAROXYSMAL SWEATS: NO SWEAT VISIBLE
SKIP TO QUESTIONS 9-10: 0
AUDIT TOTAL SCORE: 10
AUDITORY DISTURBANCES: NOT PRESENT
NAUSEA AND VOMITING: NO NAUSEA AND NO VOMITING
NAUSEA AND VOMITING: NO NAUSEA AND NO VOMITING
ORIENTATION AND CLOUDING OF SENSORIUM: ORIENTED AND CAN DO SERIAL ADDITIONS
PAROXYSMAL SWEATS: NO SWEAT VISIBLE
TREMOR: NOT VISIBLE, BUT CAN BE FELT FINGERTIP TO FINGERTIP
AGITATION: NORMAL ACTIVITY
ANXIETY: NO ANXIETY, AT EASE
PAROXYSMAL SWEATS: NO SWEAT VISIBLE
TREMOR: 2
AUDIT-C TOTAL SCORE: 10
AUDIT TOTAL SCORE: 0
VISUAL DISTURBANCES: NOT PRESENT
ANXIETY: NO ANXIETY, AT EASE
HOW OFTEN DURING THE LAST YEAR HAVE YOU HAD A FEELING OF GUILT OR REMORSE AFTER DRINKING: NEVER
HOW MANY STANDARD DRINKS CONTAINING ALCOHOL DO YOU HAVE ON A TYPICAL DAY: 7 TO 9
HEADACHE, FULLNESS IN HEAD: NOT PRESENT
TOTAL SCORE: 1
TOTAL SCORE: 4
NAUSEA AND VOMITING: NO NAUSEA AND NO VOMITING
AUDITORY DISTURBANCES: NOT PRESENT
VISUAL DISTURBANCES: NOT PRESENT
AUDITORY DISTURBANCES: NOT PRESENT
ORIENTATION AND CLOUDING OF SENSORIUM: ORIENTED AND CAN DO SERIAL ADDITIONS
VISUAL DISTURBANCES: NOT PRESENT
ANXIETY: NO ANXIETY, AT EASE
ANXIETY: NO ANXIETY, AT EASE
ORIENTATION AND CLOUDING OF SENSORIUM: ORIENTED AND CAN DO SERIAL ADDITIONS
ANXIETY: NO ANXIETY, AT EASE
ORIENTATION AND CLOUDING OF SENSORIUM: ORIENTED AND CAN DO SERIAL ADDITIONS
ANXIETY: NO ANXIETY, AT EASE
AGITATION: NORMAL ACTIVITY
AGITATION: NORMAL ACTIVITY
HEADACHE, FULLNESS IN HEAD: NOT PRESENT
HEADACHE, FULLNESS IN HEAD: MILD
PAROXYSMAL SWEATS: NO SWEAT VISIBLE
AUDIT-C TOTAL SCORE: 11
NAUSEA AND VOMITING: NO NAUSEA AND NO VOMITING
HOW OFTEN DURING THE LAST YEAR HAVE YOU NEEDED AN ALCOHOLIC DRINK FIRST THING IN THE MORNING TO GET YOURSELF GOING AFTER A NIGHT OF HEAVY DRINKING: NEVER
ORIENTATION AND CLOUDING OF SENSORIUM: ORIENTED AND CAN DO SERIAL ADDITIONS
HAS A RELATIVE, FRIEND, DOCTOR, OR ANOTHER HEALTH PROFESSIONAL EXPRESSED CONCERN ABOUT YOUR DRINKING OR SUGGESTED YOU CUT DOWN: NO
TOTAL SCORE: 1
VISUAL DISTURBANCES: NOT PRESENT
TREMOR: NOT VISIBLE, BUT CAN BE FELT FINGERTIP TO FINGERTIP
NAUSEA AND VOMITING: NO NAUSEA AND NO VOMITING
TOTAL SCORE: 2
TREMOR: NO TREMOR

## 2024-10-07 ASSESSMENT — PATIENT HEALTH QUESTIONNAIRE - PHQ9
1. LITTLE INTEREST OR PLEASURE IN DOING THINGS: NOT AT ALL
2. FEELING DOWN, DEPRESSED OR HOPELESS: NOT AT ALL
SUM OF ALL RESPONSES TO PHQ9 QUESTIONS 1 & 2: 0

## 2024-10-07 ASSESSMENT — COGNITIVE AND FUNCTIONAL STATUS - GENERAL
MOBILITY SCORE: 24
MOBILITY SCORE: 24
DAILY ACTIVITIY SCORE: 24
PATIENT BASELINE BEDBOUND: NO

## 2024-10-07 ASSESSMENT — ACTIVITIES OF DAILY LIVING (ADL)
DRESSING YOURSELF: INDEPENDENT
WALKS IN HOME: INDEPENDENT
HEARING - RIGHT EAR: FUNCTIONAL
JUDGMENT_ADEQUATE_SAFELY_COMPLETE_DAILY_ACTIVITIES: YES
BATHING: INDEPENDENT
GROOMING: INDEPENDENT
TOILETING: INDEPENDENT
LACK_OF_TRANSPORTATION: NO
LACK_OF_TRANSPORTATION: NO
PATIENT'S MEMORY ADEQUATE TO SAFELY COMPLETE DAILY ACTIVITIES?: YES
HEARING - LEFT EAR: FUNCTIONAL
ADEQUATE_TO_COMPLETE_ADL: YES
LACK_OF_TRANSPORTATION: NO
FEEDING YOURSELF: INDEPENDENT
LACK_OF_TRANSPORTATION: NO

## 2024-10-07 ASSESSMENT — PAIN - FUNCTIONAL ASSESSMENT
PAIN_FUNCTIONAL_ASSESSMENT: 0-10

## 2024-10-07 ASSESSMENT — PAIN SCALES - GENERAL
PAINLEVEL_OUTOF10: 0 - NO PAIN
PAINLEVEL_OUTOF10: 3
PAINLEVEL_OUTOF10: 0 - NO PAIN
PAINLEVEL_OUTOF10: 0 - NO PAIN

## 2024-10-07 ASSESSMENT — PAIN DESCRIPTION - LOCATION: LOCATION: HEAD

## 2024-10-07 ASSESSMENT — PAIN DESCRIPTION - ORIENTATION: ORIENTATION: RIGHT;LEFT

## 2024-10-07 NOTE — PROGRESS NOTES
TCC met with patient. Assessment complete. Patient lives with his significant other in an apartment. Patient is independent. Patient drives and is able to shop, cook and clean. Patient treated for depression. Patient smokes about 1/2 PPD. Patient drinks 6 beers and 6 shots almost daily. Patient follows Dr. RADHA Simon. Patient fills prescriptions at [facility]. At this time the discharge plan is for patient to return home with no skilled services. Will follow.      10/07/24 1413   Physical Activity   On average, how many days per week do you engage in moderate to strenuous exercise (like a brisk walk)? 5 days   On average, how many minutes do you engage in exercise at this level? 30 min   Financial Resource Strain   How hard is it for you to pay for the very basics like food, housing, medical care, and heating? Not very   Housing Stability   In the last 12 months, was there a time when you were not able to pay the mortgage or rent on time? N   In the past 12 months, how many times have you moved where you were living? 0   At any time in the past 12 months, were you homeless or living in a shelter (including now)? N   Transportation Needs   In the past 12 months, has lack of transportation kept you from medical appointments or from getting medications? no   In the past 12 months, has lack of transportation kept you from meetings, work, or from getting things needed for daily living? No   Food Insecurity   Within the past 12 months, you worried that your food would run out before you got the money to buy more. Never true   Within the past 12 months, the food you bought just didn't last and you didn't have money to get more. Never true   Stress   Do you feel stress - tense, restless, nervous, or anxious, or unable to sleep at night because your mind is troubled all the time - these days? Not at all   Social Connections   In a typical week, how many times do you talk on the phone with family, friends, or neighbors?  Twice a week   How often do you get together with friends or relatives? Twice   How often do you attend Confucianist or Anabaptism services? Never   Do you belong to any clubs or organizations such as Confucianist groups, unions, fraternal or athletic groups, or school groups? No   How often do you attend meetings of the clubs or organizations you belong to? Never   Are you , , , , never , or living with a partner? Living with   Intimate Partner Violence   Within the last year, have you been afraid of your partner or ex-partner? No   Within the last year, have you been humiliated or emotionally abused in other ways by your partner or ex-partner? No   Within the last year, have you been kicked, hit, slapped, or otherwise physically hurt by your partner or ex-partner? No   Within the last year, have you been raped or forced to have any kind of sexual activity by your partner or ex-partner? No   Alcohol Use   Q1: How often do you have a drink containing alcohol? 4 or more ti   Q2: How many drinks containing alcohol do you have on a typical day when you are drinking? 7 to 9   Q3: How often do you have six or more drinks on one occasion? Daily   Utilities   In the past 12 months has the electric, gas, oil, or water company threatened to shut off services in your home? No   Health Literacy   How often do you need to have someone help you when you read instructions, pamphlets, or other written material from your doctor or pharmacy? Never

## 2024-10-07 NOTE — SIGNIFICANT EVENT
BG RESULTS;   pH 7.39  pCO2 28  pO2 52  Na+ 131  K+ 4.3  Cl+ 102  Ca++ 1.05  Glu 124  Lac 6.7     tHb 10.2  O2Hb 79.9  sO2 81.8    BE(B) -7.0  AG 16  O2ct 11.5  HCO3 16.9         IT WAS DONE ON CPAP +8 AND 60% FiO2@

## 2024-10-07 NOTE — CARE PLAN
The patient's goals for the shift include  to feel better    The clinical goals for the shift include remain hemodynamically stable      Problem: Pain - Adult  Goal: Verbalizes/displays adequate comfort level or baseline comfort level  Outcome: Progressing     Problem: Safety - Adult  Goal: Free from fall injury  Outcome: Progressing     Problem: Discharge Planning  Goal: Discharge to home or other facility with appropriate resources  Outcome: Progressing     Problem: Chronic Conditions and Co-morbidities  Goal: Patient's chronic conditions and co-morbidity symptoms are monitored and maintained or improved  Outcome: Progressing     Problem: Pain  Goal: Takes deep breaths with improved pain control throughout the shift  Outcome: Progressing  Goal: Turns in bed with improved pain control throughout the shift  Outcome: Progressing  Goal: Walks with improved pain control throughout the shift  Outcome: Progressing  Goal: Performs ADL's with improved pain control throughout shift  Outcome: Progressing  Goal: Participates in PT with improved pain control throughout the shift  Outcome: Progressing  Goal: Free from opioid side effects throughout the shift  Outcome: Progressing  Goal: Free from acute confusion related to pain meds throughout the shift  Outcome: Progressing     Problem: Skin  Goal: Decreased wound size/increased tissue granulation at next dressing change  Outcome: Progressing  Flowsheets (Taken 10/7/2024 0340)  Decreased wound size/increased tissue granulation at next dressing change:   Protective dressings over bony prominences   Utilize specialty bed per algorithm  Goal: Participates in plan/prevention/treatment measures  Outcome: Progressing  Flowsheets (Taken 10/7/2024 0340)  Participates in plan/prevention/treatment measures:   Elevate heels   Increase activity/out of bed for meals  Goal: Prevent/manage excess moisture  Outcome: Progressing  Flowsheets (Taken 10/7/2024 0340)  Prevent/manage excess  moisture:   Cleanse incontinence/protect with barrier cream   Moisturize dry skin  Goal: Prevent/minimize sheer/friction injuries  Outcome: Progressing  Flowsheets (Taken 10/7/2024 0340)  Prevent/minimize sheer/friction injuries:   Utilize specialty bed per algorithm   Increase activity/out of bed for meals  Goal: Promote/optimize nutrition  Outcome: Progressing  Flowsheets (Taken 10/7/2024 0340)  Promote/optimize nutrition:   Discuss with provider if NPO > 2 days   Monitor/record intake including meals  Goal: Promote skin healing  Outcome: Progressing  Flowsheets (Taken 10/7/2024 0340)  Promote skin healing: Turn/reposition every 2 hours/use positioning/transfer devices

## 2024-10-07 NOTE — CARE PLAN
The patient's goals for the shift include  rest    The clinical goals for the shift include wean oxygen and keep comfortable      Problem: Pain - Adult  Goal: Verbalizes/displays adequate comfort level or baseline comfort level  Outcome: Progressing     Problem: Safety - Adult  Goal: Free from fall injury  Outcome: Progressing     Problem: Discharge Planning  Goal: Discharge to home or other facility with appropriate resources  Outcome: Progressing     Problem: Chronic Conditions and Co-morbidities  Goal: Patient's chronic conditions and co-morbidity symptoms are monitored and maintained or improved  Outcome: Progressing     Problem: Pain  Goal: Takes deep breaths with improved pain control throughout the shift  Outcome: Progressing  Goal: Turns in bed with improved pain control throughout the shift  Outcome: Progressing  Goal: Walks with improved pain control throughout the shift  Outcome: Progressing  Goal: Performs ADL's with improved pain control throughout shift  Outcome: Progressing  Goal: Participates in PT with improved pain control throughout the shift  Outcome: Progressing  Goal: Free from opioid side effects throughout the shift  Outcome: Progressing  Goal: Free from acute confusion related to pain meds throughout the shift  Outcome: Progressing     Problem: Skin  Goal: Decreased wound size/increased tissue granulation at next dressing change  Outcome: Progressing  Goal: Participates in plan/prevention/treatment measures  Outcome: Progressing  Goal: Prevent/manage excess moisture  Outcome: Progressing  Goal: Prevent/minimize sheer/friction injuries  Outcome: Progressing  Goal: Promote/optimize nutrition  Outcome: Progressing  Goal: Promote skin healing  Outcome: Progressing     Problem: Fall/Injury  Goal: Not fall by end of shift  Outcome: Progressing  Goal: Be free from injury by end of the shift  Outcome: Progressing  Goal: Verbalize understanding of personal risk factors for fall in the  hospital  Outcome: Progressing  Goal: Verbalize understanding of risk factor reduction measures to prevent injury from fall in the home  Outcome: Progressing  Goal: Use assistive devices by end of the shift  Outcome: Progressing  Goal: Pace activities to prevent fatigue by end of the shift  Outcome: Progressing

## 2024-10-07 NOTE — ED PROVIDER NOTES
HPI   Chief Complaint   Patient presents with    Flu Symptoms     Cough, congestion, runny nose, body aches x 1 week       HPI  Patient is a 49-year-old male with history of anxiety, depression presenting to ER for evaluation of flulike symptoms for the past week.  Patient endorses cough, congestion, runny nose as well as body aches and chills for 1 week.  He states that he is concerned because he has been dosing with Tylenol and Motrin and he has been unable to keep his fever down for the past day.  He otherwise denies chest pain or shortness of breath.  He denies abdominal pain, nausea, vomiting lightheadedness or dizziness.  Concerned that his symptoms are getting worse.      Patient History   No past medical history on file.  No past surgical history on file.  No family history on file.  Social History     Tobacco Use    Smoking status: Every Day     Current packs/day: 1.00     Types: Cigarettes    Smokeless tobacco: Never   Vaping Use    Vaping status: Former   Substance Use Topics    Alcohol use: Not on file    Drug use: Yes     Types: Marijuana       Physical Exam   ED Triage Vitals [10/06/24 1942]   Temperature Heart Rate Respirations BP   (!) 39.3 °C (102.7 °F) (!) 130 17 106/55      Pulse Ox Temp Source Heart Rate Source Patient Position   95 % Oral Monitor Sitting      BP Location FiO2 (%)     Right arm --       Physical Exam  Vitals and nursing note reviewed.   Constitutional:       General: He is not in acute distress.     Appearance: He is well-developed.      Comments: Resting in exam chair in no apparent distress.   HENT:      Head: Normocephalic and atraumatic.   Eyes:      Conjunctiva/sclera: Conjunctivae normal.   Cardiovascular:      Rate and Rhythm: Regular rhythm. Tachycardia present.      Heart sounds: No murmur heard.     Comments: Peripheral radial pulses +2 bilaterally  Pulmonary:      Effort: Pulmonary effort is normal. No respiratory distress.      Comments: Slight wheeze and rhonchi in  the right lung clear to auscultation on the left lung  Abdominal:      Palpations: Abdomen is soft.      Tenderness: There is no abdominal tenderness.   Musculoskeletal:         General: No swelling.      Cervical back: Normal range of motion and neck supple. No rigidity or tenderness.   Skin:     General: Skin is warm and dry.      Capillary Refill: Capillary refill takes less than 2 seconds.      Comments: Patient is slightly diaphoretic to the touch   Neurological:      Mental Status: He is alert.      Comments: Awake and alert oriented to person place and time providing history   Psychiatric:         Mood and Affect: Mood normal.           ED Course & MDM   Diagnoses as of 10/07/24 1955   Pneumonia of right upper lobe due to infectious organism   Sepsis (Multi)                 No data recorded     Kasey Coma Scale Score: 15 (10/07/24 1600 : Constantino Benito RN)                           Medical Decision Making  Parts of this chart have been completed using voice recognition software. Please excuse any errors of transcription.  My thought process and reason for plan has been formulated from the time that I saw the patient until the time of disposition and is not specific to one specific moment during their visit and furthermore my MDM encompasses this entire chart and not only this text box.      HPI: Detailed above.    Exam: A medically appropriate exam performed, outlined above, given the known history and presentation.    History obtained from: Patient, significant other    Social Determinants of Health considered during this visit: Lives independently    Medications given during visit:  Medications   azithromycin (Zithromax) tablet 500 mg (500 mg oral Given 10/7/24 0929)   acetaminophen (Tylenol) oral liquid 650 mg ( oral MAR Unhold 10/7/24 1703)     Or   acetaminophen (Tylenol) tablet 650 mg ( oral MAR Unhold 10/7/24 1703)   ondansetron (Zofran) injection 4 mg ( intravenous MAR Unhold 10/7/24 1703)    folic acid (Folvite) tablet 1 mg ( oral MAR Unhold 10/7/24 1703)   multivitamin with minerals 1 tablet (1 tablet oral Given 10/7/24 1142)   thiamine (Vitamin B1) injection 100 mg ( intravenous MAR Unhold 10/7/24 1703)   thiamine (Vitamin B-1) tablet 100 mg ( oral MAR Unhold 10/7/24 1703)   PHENobarbitaL (Luminal) tablet 64.8 mg ( oral MAR Unhold 10/7/24 1703)     Followed by   PHENobarbitaL (Luminal) tablet 32.4 mg ( oral MAR Unhold 10/7/24 1703)   cefTRIAXone (Rocephin) 1 g in dextrose (iso) IV 50 mL (has no administration in time range)   loperamide (Imodium) capsule 2 mg ( oral MAR Unhold 10/7/24 1703)   sodium chloride 0.9% infusion (has no administration in time range)   acetaminophen (Tylenol) tablet 975 mg (975 mg oral Given 10/6/24 2317)   sodium chloride 0.9 % bolus 2,709 mL (0 mL intravenous Stopped 10/6/24 2317)   ketorolac (Toradol) injection 15 mg (15 mg intravenous Given 10/6/24 2218)   cefTRIAXone (Rocephin) 2 g in dextrose (iso) IV 50 mL (0 g intravenous Stopped 10/6/24 2211)   azithromycin 500 mg in dextrose 5% 250 mL IV (0 mg intravenous Stopped 10/6/24 2311)   iohexol (OMNIPaque) 350 mg iodine/mL solution 75 mL (75 mL intravenous Given 10/7/24 0117)   magnesium sulfate 4 g in sterile water for injection 50 mL (0 g intravenous Stopped 10/7/24 1036)        Diagnostic/tests  Labs Reviewed   RESPIRATORY CULTURE/SMEAR - Abnormal       Result Value    Respiratory Culture/Smear   (*)     Value: Culture not performed. See Gram stain findings. Recollect if clinically indicated.    Gram Stain   (*)     Value: Gram stain indicates specimen contains significant salivary contamination.   CBC WITH AUTO DIFFERENTIAL - Abnormal    WBC 2.1 (*)     nRBC 0.0      RBC 3.85 (*)     Hemoglobin 11.2 (*)     Hematocrit 34.2 (*)     MCV 89      MCH 29.1      MCHC 32.7      RDW 17.1 (*)     Platelets 71 (*)     Immature Granulocytes %, Automated 0.5      Immature Granulocytes Absolute, Automated 0.01     COMPREHENSIVE  METABOLIC PANEL - Abnormal    Glucose 60 (*)     Sodium 134 (*)     Potassium 4.0      Chloride 102      Bicarbonate 16 (*)     Anion Gap 20      Urea Nitrogen 12      Creatinine 0.97      eGFR >90      Calcium 8.3 (*)     Albumin 3.2 (*)     Alkaline Phosphatase 105      Total Protein 7.9      AST 43 (*)     Bilirubin, Total 1.6 (*)     ALT 16     LACTATE - Abnormal    Lactate 5.3 (*)     Narrative:     Venipuncture immediately after or during the administration of Metamizole may lead to falsely low results. Testing should be performed immediately prior to Metamizole dosing.   URINALYSIS WITH REFLEX CULTURE AND MICROSCOPIC - Abnormal    Color, Urine Yellow      Appearance, Urine Turbid (*)     Specific Gravity, Urine 1.017      pH, Urine 5.5      Protein, Urine 50 (1+) (*)     Glucose, Urine Normal      Blood, Urine 0.03 (TRACE) (*)     Ketones, Urine NEGATIVE      Bilirubin, Urine NEGATIVE      Urobilinogen, Urine Normal      Nitrite, Urine NEGATIVE      Leukocyte Esterase, Urine NEGATIVE     LACTATE - Abnormal    Lactate 8.0 (*)     Narrative:     Venipuncture immediately after or during the administration of Metamizole may lead to falsely low results. Testing should be performed immediately prior to Metamizole dosing.   LACTATE - Abnormal    Lactate 7.5 (*)     Narrative:     Venipuncture immediately after or during the administration of Metamizole may lead to falsely low results. Testing should be performed immediately prior to Metamizole dosing.   BLOOD GAS ARTERIAL, COOX - Abnormal    POCT pH, Arterial 7.39      POCT pCO2, Arterial 28 (*)     POCT pO2, Arterial 52 (*)     POCT SO2, Arterial 82 (*)     POCT Oxy Hemoglobin, Arterial 79.9 (*)     POCT Base Excess, Arterial -7.0 (*)     POCT HCO3 Calculated, Arterial 16.9 (*)     POCT Hemoglobin, Arterial 10.2 (*)     POCT Carboxyhemoglobin, Arterial        POCT Methemoglobin, Arterial        POCT Deoxy Hemoglobin, Arterial        Patient Temperature 37.0       FiO2 21      Site of Arterial Puncture Radial Right      Benjy's Test Positive     CBC WITH AUTO DIFFERENTIAL - Abnormal    WBC 8.9      nRBC 0.0      RBC 3.49 (*)     Hemoglobin 10.0 (*)     Hematocrit 31.9 (*)     MCV 91      MCH 28.7      MCHC 31.3 (*)     RDW 16.9 (*)     Platelets 56 (*)     Immature Granulocytes %, Automated 4.5 (*)     Immature Granulocytes Absolute, Automated 0.40     COMPREHENSIVE METABOLIC PANEL - Abnormal    Glucose 131 (*)     Sodium 132 (*)     Potassium 4.7      Chloride 103      Bicarbonate 20 (*)     Anion Gap 14      Urea Nitrogen 15      Creatinine 1.13      eGFR 80      Calcium 7.8 (*)     Albumin 2.8 (*)     Alkaline Phosphatase 67      Total Protein 6.9      AST 43 (*)     Bilirubin, Total 1.5 (*)     ALT 16     MAGNESIUM - Abnormal    Magnesium 0.98 (*)    PROCALCITONIN - Abnormal    Procalcitonin 43.27 (*)     Narrative:     Procalcitonin (PCT) results measured serially can  aid in decision-making for antibiotic discontinuation in  patients with suspected or confirmed sepsis in conjunction  with additional clinical information. Antibiotic  discontinuation may be considered with a change in PCT of  >80% from the peak result or when PCT falls below 0.50 ng/mL.    Procalcitonin results should not be used in isolation but  should be interpreted in conjunction with additional clinical  and laboratory findings. Procalcitonin results should not be  used to guide the initiation of antibiotic therapy.    Falsely low PCT values in the presence of bacterial infection  may occur in early infection, with atypical pathogens,  localized infections, and subacute infectious endocarditis.    Falsely elevated results outside of severe bacterial  infection/sepsis may be seen in patients with renal failure  or insufficiency, severe trauma or burns, recent major  abdominal/cardiac surgery, acute multi-organ failure, rarely  in patients with medullary thyroid carcinoma and rare  neuroendocrine  tumors, and non-specific interfering antibodies  (heterophile antibodies, rheumatoid factor, human anti-mouse  antibodies (HAMA), etc).    Performance of the PCT test in pediatric patients (<19yo),  pregnant women, immunocompromised patients, and patients on  immunomodulatory medications has not been evaluated.   BLOOD GAS VENOUS FULL PANEL - Abnormal    POCT pH, Venous 7.41      POCT pCO2, Venous 27 (*)     POCT pO2, Venous 60 (*)     POCT SO2, Venous 89 (*)     POCT Oxy Hemoglobin, Venous 86.6 (*)     POCT Hematocrit Calculated, Venous 29.0 (*)     POCT Sodium, Venous 130 (*)     POCT Potassium, Venous 4.2      POCT Chloride, Venous 103      POCT Ionized Calicum, Venous 1.07 (*)     POCT Glucose, Venous 127 (*)     POCT Lactate, Venous 6.7 (*)     POCT Base Excess, Venous -6.5 (*)     POCT HCO3 Calculated, Venous 17.1 (*)     POCT Hemoglobin, Venous 9.8 (*)     POCT Anion Gap, Venous 14.0      Patient Temperature 37.0      FiO2 0     BLOOD GAS LACTIC ACID, VENOUS - Abnormal    POCT Lactate, Venous 4.3 (*)    BLOOD GAS VENOUS FULL PANEL - Abnormal    POCT pH, Venous 7.41      POCT pCO2, Venous 32 (*)     POCT pO2, Venous 48 (*)     POCT SO2, Venous 76 (*)     POCT Oxy Hemoglobin, Venous 74.1      POCT Hematocrit Calculated, Venous 31.0 (*)     POCT Sodium, Venous 131 (*)     POCT Potassium, Venous 5.2      POCT Chloride, Venous 103      POCT Ionized Calicum, Venous 1.12      POCT Glucose, Venous 128 (*)     POCT Lactate, Venous 4.3 (*)     POCT Base Excess, Venous -3.7 (*)     POCT HCO3 Calculated, Venous 20.3 (*)     POCT Hemoglobin, Venous 10.3 (*)     POCT Anion Gap, Venous 13.0      Patient Temperature 37.0      FiO2 28     BLOOD GAS VENOUS FULL PANEL - Abnormal    POCT pH, Venous 7.43      POCT pCO2, Venous 34 (*)     POCT pO2, Venous 45      POCT SO2, Venous 73      POCT Oxy Hemoglobin, Venous 71.1      POCT Hematocrit Calculated, Venous 32.0 (*)     POCT Sodium, Venous 131 (*)     POCT Potassium, Venous 4.2       POCT Chloride, Venous 103      POCT Ionized Calicum, Venous 1.14      POCT Glucose, Venous 142 (*)     POCT Lactate, Venous 4.3 (*)     POCT Base Excess, Venous -1.3      POCT HCO3 Calculated, Venous 22.6      POCT Hemoglobin, Venous 10.6 (*)     POCT Anion Gap, Venous 10.0      Patient Temperature 37.0      FiO2 21     MANUAL DIFFERENTIAL - Abnormal    Neutrophils %, Manual 72.0      Bands %, Manual 1.0      Lymphocytes %, Manual 19.0      Monocytes %, Manual 5.0      Eosinophils %, Manual 0.0      Basophils %, Manual 1.0      Metamyelocytes %, Manual 1.0      Myelocytes %, Manual 1.0      Seg Neutrophils Absolute, Manual 1.51      Bands Absolute, Manual 0.02      Lymphocytes Absolute, Manual 0.40 (*)     Monocytes Absolute, Manual 0.11      Eosinophils Absolute, Manual 0.00      Basophils Absolute, Manual 0.02      Metamyelocytes Absolute, Manual 0.02      Myelocytes Absolute, Manual 0.02      Total Cells Counted 100      Neutrophils Absolute, Manual 1.53      RBC Morphology See Below      Polychromasia Mild      Dohle Bodies Present     MANUAL DIFFERENTIAL - Abnormal    Neutrophils %, Manual 56.0      Bands %, Manual 25.0      Lymphocytes %, Manual 9.0      Monocytes %, Manual 5.0      Eosinophils %, Manual 0.0      Basophils %, Manual 1.0      Metamyelocytes %, Manual 4.0      Seg Neutrophils Absolute, Manual 4.98      Bands Absolute, Manual 2.23 (*)     Lymphocytes Absolute, Manual 0.80 (*)     Monocytes Absolute, Manual 0.45      Eosinophils Absolute, Manual 0.00      Basophils Absolute, Manual 0.09      Metamyelocytes Absolute, Manual 0.36      Total Cells Counted 100      Neutrophils Absolute, Manual 7.21      RBC Morphology See Below      Polychromasia Mild      Dohle Bodies Present     BLOOD CULTURE - Normal    Blood Culture Loaded on Instrument - Culture in progress     BLOOD CULTURE - Normal    Blood Culture Loaded on Instrument - Culture in progress     INFLUENZA A AND B PCR - Normal    Flu A Result  Not Detected      Flu B Result Not Detected      Narrative:     This assay is an in vitro diagnostic multiplex nucleic acid amplification test for the detection and discrimination of Influenza A & B from nasopharyngeal specimens, and has been validated for use at [facility]. Negative results do not preclude Influenza A/B infections, and should not be used as the sole basis for diagnosis, treatment, or other management decisions. If Influenza A/B and RSV PCR results are negative, testing for Parainfluenza virus, Adenovirus and Metapneumovirus is routinely performed for [facility] pediatric oncology and intensive care inpatients, and is available on other patients by placing an add-on request.   SARS-COV-2 PCR - Normal    Coronavirus 2019, PCR Not Detected      Narrative:     This assay has received FDA Emergency Use Authorization (EUA) and is only authorized for the duration of time that circumstances exist to justify the authorization of the emergency use of in vitro diagnostic tests for the detection of SARS-CoV-2 virus and/or diagnosis of COVID-19 infection under section 564(b)(1) of the Act, 21 U.S.C. 360bbb-3(b)(1). This assay is an in vitro diagnostic nucleic acid amplification test for the qualitative detection of SARS-CoV-2 from nasopharyngeal specimens and has been validated for use at [facility]. Negative results do not preclude COVID-19 infections and should not be used as the sole basis for diagnosis, treatment, or other management decisions.     PHOSPHORUS - Normal    Phosphorus 2.5     TROPONIN I, HIGH SENSITIVITY - Normal    Troponin I, High Sensitivity 17      Narrative:     Less than 99th percentile of normal range cutoff-  Female and children under 18 years old <14 ng/L; Male <21 ng/L: Negative  Repeat testing should be performed if clinically indicated.     Female and children under 18 years old 14-50 ng/L; Male 21-50 ng/L:  Consistent with possible cardiac  damage and possible increased clinical   risk. Serial measurements may help to assess extent of myocardial damage.     >50 ng/L: Consistent with cardiac damage, increased clinical risk and  myocardial infarction. Serial measurements may help assess extent of   myocardial damage.      NOTE: Children less than 1 year old may have higher baseline troponin   levels and results should be interpreted in conjunction with the overall   clinical context.     NOTE: Troponin I testing is performed using a different   testing methodology at [facility] than at other   [facility]. Direct result comparisons should only   be made within the same method.   MAGNESIUM - Normal    Magnesium 2.19     URINALYSIS WITH REFLEX CULTURE AND MICROSCOPIC    Narrative:     The following orders were created for panel order Urinalysis with Reflex Culture and Microscopic.  Procedure                               Abnormality         Status                     ---------                               -----------         ------                     Urinalysis with Reflex C...[[id number]]  Abnormal            Final result               Extra Urine Gray Tube[[id number]]                            Final result                 Please view results for these tests on the individual orders.   EXTRA URINE GRAY TUBE    Extra Tube Hold for add-ons.     CBC WITH AUTO DIFFERENTIAL   COMPREHENSIVE METABOLIC PANEL   MAGNESIUM   PHOSPHORUS   TYPE AND SCREEN   VERAB/VERIFY ABORH   URINALYSIS MICROSCOPIC WITH REFLEX CULTURE    WBC, Urine 1-5      RBC, Urine 1-2      Mucus, Urine FEW        CT angio chest for pulmonary embolism   Final Result   No evidence of acute pulmonary embolism.        Consolidative opacity in the right upper lobe compatible with   pneumonia.        Hepatic steatosis and liver cirrhosis with mild abdominal and pelvic   ascites. Cholelithiasis and gallbladder wall   thickness/pericholecystic edema may relate to the underlying liver    disease, however clinical correlation recommended if there is concern   for acute cholecystitis in the setting for right upper quadrant pain   and ultrasound may be obtained for further evaluation.        Colonic wall thickening which may be secondary to hypoproteinemia, or   active to ascites or infectious/inflammatory in etiology.        Colonic diverticulosis.        MACRO:   None        Signed by: Franco Keene 10/7/2024 2:38 AM   Dictation workstation:   [id number]      CT abdomen pelvis wo IV contrast   Final Result   No evidence of acute pulmonary embolism.        Consolidative opacity in the right upper lobe compatible with   pneumonia.        Hepatic steatosis and liver cirrhosis with mild abdominal and pelvic   ascites. Cholelithiasis and gallbladder wall   thickness/pericholecystic edema may relate to the underlying liver   disease, however clinical correlation recommended if there is concern   for acute cholecystitis in the setting for right upper quadrant pain   and ultrasound may be obtained for further evaluation.        Colonic wall thickening which may be secondary to hypoproteinemia, or   active to ascites or infectious/inflammatory in etiology.        Colonic diverticulosis.        MACRO:   None        Signed by: Franco Keene 10/7/2024 2:38 AM   Dictation workstation:   [id number]      XR chest 2 views   Final Result   1. Right upper lobe pneumonia. Radiographic follow-up to resolution   in 3-4 weeks advised                  MACRO:   None        Signed by: Tod Johnson 10/6/2024 9:00 PM   Dictation workstation:   [id number]      US gallbladder    (Results Pending)        Considerations/further MDM:  Patient is a 49-year-old male presenting for evaluation of flulike symptoms    Patient is awake and alert in no apparent distress upon arrival to the ER but vital signs are concerning for tachycardia and patient is febrile.  Patient otherwise not hypotensive, saturating 95% on room air with  no evidence of airway compromise or respiratory distress.  Sepsis bundle ordered by provider in triage.  Full 30 cc/kg fluid bolus was provided.  Laboratory workup is without leukocytosis but is concerning for pancytopenia with worsening of thrombocytopenia and also elevation in bilirubin.  Chest x-ray is suggestive of a right upper lobe pneumonia.  Patient provided ceftriaxone and azithromycin through the sepsis bundle set for treatment of pneumonia.  Initial lactic acid 5.3 with repeat lactic acid 8 despite initiation of fluid bolus and antibiotic therapy.  Patient's blood pressure remained slightly soft despite fluid bolus.  I did perform a sepsis reperfusion exam prior to admitting to the patient to the ICU for further management and treatment of pneumonia with evidence of sepsis.  Patient was counseled on all findings and is agreeable with the treatment plan.      Procedure  Critical Care    Performed by: Kendal Becerra PA-C  Authorized by: Liane Leong DO    Critical care provider statement:     Critical care time (minutes):  45    Critical care time was exclusive of:  Separately billable procedures and treating other patients    Critical care was necessary to treat or prevent imminent or life-threatening deterioration of the following conditions:  Sepsis    Critical care was time spent personally by me on the following activities:  Development of treatment plan with patient or surrogate, discussions with primary provider, evaluation of patient's response to treatment, examination of patient, obtaining history from patient or surrogate, ordering and performing treatments and interventions, ordering and review of laboratory studies, ordering and review of radiographic studies and re-evaluation of patient's condition    Care discussed with: admitting provider         Kendal Becerra PA-C  10/07/24 1955

## 2024-10-07 NOTE — PROGRESS NOTES
Pt last lactate level was 4.3. Message sent to attending inquiring if they want continuous IV fluids ordered.

## 2024-10-07 NOTE — PROGRESS NOTES
[facility] Critical Care Medicine       Date:  10/7/2024  Patient:  Tyler Hernandez  YOB: 1975  MRN:  [id number]   Admit Date:  10/6/2024      Chief Complaint   Patient presents with    Flu Symptoms     Cough, congestion, runny nose, body aches x 1 week         History of Present Illness:  Tyler Hernandez is a 49 y.o. year old male patient with Past Medical History of  umbilical hernia, ETOH abuse and depression. Per patient Dr Simon was in the process of hepatic workup but unknown etiology. He presented to the ER due to fever that started this past Tuesday. He says the symptoms have been on and off since then. Well controlled with tylenol. He has chills, fevers, cough and fatigue. He also states his grandchildren were recently over and had the same type of symptoms. 3L NS given. Lactate is uptrending from 5.3 -> 8.      Interval ICU Events:  10/7: Patient A+Ox4 with complaints of mild SOB. Started in empiric azithromycin and ceftriaxone. BP soft with MAP of 70. Patient on RA. Lactate down trending to 4.3 on morning VBG.    Medical History:  No past medical history on file.  No past surgical history on file.  No medications prior to admission.     Tetracyclines  Social History     Tobacco Use    Smoking status: Every Day     Current packs/day: 1.00     Types: Cigarettes    Smokeless tobacco: Never   Vaping Use    Vaping status: Former   Substance Use Topics    Drug use: Yes     Types: Marijuana     No family history on file.    Hospital Medications:           Current Facility-Administered Medications:     acetaminophen (Tylenol) oral liquid 650 mg, 650 mg, oral, q6h PRN **OR** acetaminophen (Tylenol) tablet 650 mg, 650 mg, oral, q4h PRN, Roman Rayo APRN-CNP    azithromycin (Zithromax) tablet 500 mg, 500 mg, oral, q24h BROOKE, 500 mg at 10/07/24 0929 **FOLLOWED BY** [DISCONTINUED] azithromycin (Zithromax) tablet 250 mg, 250 mg, oral, Daily, Katie Yarbrough PA-C    [START ON 10/8/2024] cefTRIAXone  "(Rocephin) 1 g in dextrose (iso) IV 50 mL, 1 g, intravenous, q24h, Roman R Kiss, APRN-CNP    folic acid (Folvite) tablet 1 mg, 1 mg, oral, Daily, Roman R Kiss, APRN-CNP, 1 mg at 10/07/24 1142    loperamide (Imodium) capsule 2 mg, 2 mg, oral, 4x daily PRN, Roman R Kiss, APRN-CNP, 2 mg at 10/07/24 1301    multivitamin with minerals 1 tablet, 1 tablet, oral, Daily, Roman R Kiss, APRN-CNP, 1 tablet at 10/07/24 1142    ondansetron (Zofran) injection 4 mg, 4 mg, intravenous, q4h PRN, Roman R Kiss, APRN-CNP    PHENobarbitaL (Luminal) tablet 64.8 mg, 64.8 mg, oral, TID, 64.8 mg at 10/07/24 1142 **FOLLOWED BY** [START ON 10/9/2024] PHENobarbitaL (Luminal) tablet 32.4 mg, 32.4 mg, oral, TID, Roman R Kiss, APRN-CNP    [START ON 10/10/2024] thiamine (Vitamin B-1) tablet 100 mg, 100 mg, oral, Daily, Roman R Kiss, APRN-CNP    thiamine (Vitamin B1) injection 100 mg, 100 mg, intravenous, Daily, Roman R Kiss, APRN-CNP, 100 mg at 10/07/24 1142    Review of Systems:  14 point review of systems was completed and negative except for those specially mention in my HPI    Physical Exam:    Heart Rate:  []   Temp:  [37.3 °C (99.1 °F)-39.3 °C (102.7 °F)]   Resp:  [16-32]   BP: ()/(52-74)   Height:  [188 cm (6' 2.02\")]   Weight:  [90.3 kg (199 lb)-91.6 kg (201 lb 15.1 oz)]   SpO2:  [95 %-100 %]     Physical Exam  Constitutional:       Appearance: Normal appearance. He is well-groomed.   HENT:      Mouth/Throat:      Mouth: Mucous membranes are moist.      Pharynx: Oropharynx is clear.   Eyes:      Pupils: Pupils are equal, round, and reactive to light.   Cardiovascular:      Rate and Rhythm: Normal rate and regular rhythm.      Pulses: Normal pulses.   Pulmonary:      Effort: Pulmonary effort is normal.   Abdominal:      General: Abdomen is flat.      Palpations: Abdomen is soft.   Musculoskeletal:         General: Normal range of motion.   Skin:     General: Skin is warm and dry.      Capillary Refill: Capillary " refill takes less than 2 seconds.   Neurological:      General: No focal deficit present.      Mental Status: He is alert and oriented to person, place, and time. Mental status is at baseline.      Comments: Noticeable muscle tremor of mouth          Objective:    I have reviewed all medications, laboratory results, and imaging pertinent for today's encounter.           Intake/Output Summary (Last 24 hours) at 10/7/2024 1327  Last data filed at 10/7/2024 1200  Gross per 24 hour   Intake 701.46 ml   Output --   Net 701.46 ml         Assessment/Plan:    I am currently managing this critically ill patient for the following problems:    Plan:  Neuro/Psych/Pain Ctrl/Sedation:  ETOH dependency  - CAM ICU score q-shift  - Sleep/wake cycle hygiene  - Delirium precaution   - Pain and fever control with tylenol  - Q4 Neuro assessments  - CIWA with oral phenobarb taper  - Thiamine, folate and multivitamin    Respiratory/ENT:  CAP  Ct chest There is consolidative opacity in the right upper lobe compatible  with pneumonia. There is pulmonary emphysema. No evidence of PE  - Continuous Pulse oximetry  - Wean Fio2 to maintain Spo2>92%  - Bronchial hygiene and IS    Cardiovascular:  Sepsis not requiring vasopressors secondary to pneumonia  - Continuous Telemetry  - Systolic goals >90 and < 160  - No echo on file    Renal/Volume Status (Intra & Extravascular):  Elevated lactate  Hypomagnesemia- resolved  - Maintain urine output 0.5-1.0cc/kg/hr  - Monitor I/O's  - Replete electrolytes to maintain K >4.0 and Mg >2.0  - Daily BMP, Mg   - Fluids: 2.7 L NS on admission  - May have difficult to correct lactate based on chronic liver disease  - Mag on admission 0.98, re pleated with 4 gms, may need supplements ordered for discharge    GI:  Concern for Cirrhosis  Umbilical hernia  Ct abdomen-There is hepatic steatosis and nodularity of liver contour compatible with cirrhosis. Cholelithiasis and gallbladder wall thickness/pericholecystic  edema may relate to the underlying liver disease  - Diet: Adult full  - PPI: None  - BR: None  - Us gallbladder ordered to rule out cholelithiasis    Infectious Disease:  CAP  Leukopenia  - BC - NTD  - UA - clean  - Sputum- negative  - Daily CBC  - FLU A/B Covid negative    Heme/Onc:  Acute on Chronic thrombocytopenia  - DVT Prophylaxis: SCDs and   - Transfuse for hemoglobin < 7    Endocrine:  - POCT and SSI   - HGB A1C     OBGYN/Ortho:  - PT/OT when stable    Ethics/Code Status:  Full code    :  DVT Prophylaxis: lovenox  GI Prophylaxis: none  Bowel Regimen: none  Diet: Adult regular  CVC: None  [place]oret[place]: None  Stallings: None  Restraints: None  Dispo: Possible discharge to Dr ledbetter    Critical Care Time:  60 minutes spent in preparing to see patient (I.e. review of medical records), evaluation of diagnostics (I.e. labs, imaging, etc.), documentation, discussing plan of care with patient/ family/ caregiver, and/ or coordination of care with multidisciplinary team. Time does not include completion of procedure time.       Roman Rayo, APRN-CNP

## 2024-10-07 NOTE — SIGNIFICANT EVENT
Initially accepted patient but on review the patient follows with Dr. Simon's office, including a note in Spring of this year. Dr Simon admits his own patients at this facility and I have alerted ED staff.

## 2024-10-07 NOTE — H&P
" Critical Care Medicine       Date:  10/7/2024  Patient:  Tyler Hernandez  YOB: 1975  MRN:  [id number]   Admit Date:  10/6/2024      Chief Complaint   Patient presents with    Flu Symptoms     Cough, congestion, runny nose, body aches x 1 week         History of Present Illness:  Tyler Hernandez is a 49 y.o. year old male patient with no known Past Medical History. He presented to the ER due to flu like symptoms that started this past Tuesday. He says the symptoms have been on and off since then. He has chills, fevers, cough and fatigue. He also states his grandchildren were recently over and had the same type of symptoms. 3L NS given. Lactate is uptrending from 5.3 -> 8.     Interval ICU Events:  10/7: Patient A+Ox4 with complaints of mild SOB. Started in empiric azithromycin and ceftriaxone. BP soft with MAP of 70. Patient on RA. Cont to monitor lactate and resp status.     Objective     No past medical history on file.  No past surgical history on file.  (Not in a hospital admission)    Tetracyclines     No family history on file.    Hospital Medications:           Current Facility-Administered Medications:     acetaminophen (Tylenol) tablet 1,000 mg, 1,000 mg, oral, Once, Brianda Wu PA-C  No current outpatient medications on file.    Physical Exam:    Heart Rate:  [108-130]   Temperature:  [38.3 °C (100.9 °F)-39.3 °C (102.7 °F)]   Respirations:  [16-17]   BP: (101-106)/(53-55)   Height:  [188 cm (6' 2.02\")]   Weight:  [90.3 kg (199 lb)]   Pulse Ox:  [95 %-98 %]     Physical Exam  Constitutional:       General: He is not in acute distress.  HENT:      Head: Normocephalic and atraumatic.      Mouth/Throat:      Mouth: Mucous membranes are dry.   Eyes:      Pupils: Pupils are equal, round, and reactive to light.   Cardiovascular:      Rate and Rhythm: Normal rate and regular rhythm.      Pulses: Normal pulses.      Heart sounds: Normal heart sounds.   Pulmonary:      Breath sounds: Wheezing " present.   Abdominal:      General: There is no distension.      Palpations: Abdomen is soft.      Tenderness: There is abdominal tenderness.      Hernia: A hernia is present.   Musculoskeletal:         General: No swelling.   Skin:     General: Skin is warm.      Capillary Refill: Capillary refill takes less than 2 seconds.   Neurological:      General: No focal deficit present.      Mental Status: He is alert.         Review of Systems:  14 point review of systems was completed and negative except for those specially mention in my HPI    I have reviewed all medications, laboratory results, and imaging pertinent for today's encounter.         No intake or output data in the 24 hours ending 10/07/24 0049         Assessment/Plan:    I am currently managing this critically ill patient for the following problems:    Neuro/Psych/Pain Ctrl/Sedation:  - Pain Management: tylenol  - CAM ICU    Respiratory/ENT:  Pneumonia  - Maintain SPO2 >92%  - Continuous pulse ox monitoring   - Pulm hygiene    Cardiovascular:  Sepsis  - Continuous cardiac monitoring per ICU protocol  - Maintain MAPS >65  - Daily EKGs  - Lactate: 5.3 -> 8.0    GI:  Umbilical hernia  - Diet NPO  - BR with miralax prn  - Has surgery planned for 10/27    Renal/Volume Status (Intra & Extravascular):  - Maintain urine output 0.5-1.0cc/kg/hr  - Monitor I/O's  - Replete electrolytes to maintain K >4.0 and Mg >2.0  - Daily BMP, Mg  - Cr: 0.97    Endocrine  No DM or thyroid issues  - Monitor for hyper/hypoglycemia   - POCT prn    Infectious Disease:  Pneumonia  Leukopenia  - Empiric ATB with azithromycin and ceftriaxone (10/7-)  - Monitor SIRS criteria  - WBC: 2.1  - Fevers treated with tylenol  - BC, Procal and sputum pending    Heme/Onc:  Thrombocytopenia  - Monitor for s/sx of anemia such as bleeding and bruising   - Transfuse if Hgb <7.0   - Daily CBC  - Hgb: 11.2  - Platelets: 71    MSK:  - Padded pressure points   - Ambulatory at baseline    Skin  - ICU skin  protocol    Ethics/Code Status:  Full Code    :  DVT Prophylaxis: SCDs  GI Prophylaxis: None  Bowel Regimen: Miaralax prm  Diet: NPO  CVC: None  [place]: None  Stallings: None  Restraints: None  Dispo: Admit to ICU    Critical Care Time:  60 minutes spent in preparing to see patient (I.e.labs,imaging, etc.), documentation, discussion plan of care with patient/family/caregiver, and/ or coordination of care with multidisciplinary team including the attending. Time does not include completion of procedure time.     Katie Yarbrough PA-C  Pulmonology & Critical Care Medicine   [facility]

## 2024-10-08 LAB
ABO GROUP (TYPE) IN BLOOD: NORMAL
ABO GROUP (TYPE) IN BLOOD: NORMAL
ALBUMIN SERPL BCP-MCNC: 2.6 G/DL (ref 3.4–5)
ALP SERPL-CCNC: 64 U/L (ref 33–120)
ALT SERPL W P-5'-P-CCNC: 14 U/L (ref 10–52)
ANION GAP SERPL CALCULATED.3IONS-SCNC: 9 MMOL/L (ref 10–20)
ANTIBODY SCREEN: NORMAL
AST SERPL W P-5'-P-CCNC: 37 U/L (ref 9–39)
BASOPHILS # BLD MANUAL: 0 X10*3/UL (ref 0–0.1)
BASOPHILS NFR BLD MANUAL: 0 %
BILIRUB SERPL-MCNC: 1.2 MG/DL (ref 0–1.2)
BUN SERPL-MCNC: 13 MG/DL (ref 6–23)
CALCIUM SERPL-MCNC: 8.1 MG/DL (ref 8.6–10.3)
CHLORIDE SERPL-SCNC: 106 MMOL/L (ref 98–107)
CO2 SERPL-SCNC: 24 MMOL/L (ref 21–32)
CREAT SERPL-MCNC: 0.73 MG/DL (ref 0.5–1.3)
DOHLE BOD BLD QL SMEAR: PRESENT
EGFRCR SERPLBLD CKD-EPI 2021: >90 ML/MIN/1.73M*2
EOSINOPHIL # BLD MANUAL: 0 X10*3/UL (ref 0–0.7)
EOSINOPHIL NFR BLD MANUAL: 0 %
ERYTHROCYTE [DISTWIDTH] IN BLOOD BY AUTOMATED COUNT: 16.9 % (ref 11.5–14.5)
GLUCOSE SERPL-MCNC: 98 MG/DL (ref 74–99)
HCT VFR BLD AUTO: 27.8 % (ref 41–52)
HGB BLD-MCNC: 9.2 G/DL (ref 13.5–17.5)
IMM GRANULOCYTES # BLD AUTO: 0.07 X10*3/UL (ref 0–0.7)
IMM GRANULOCYTES NFR BLD AUTO: 0.6 % (ref 0–0.9)
LYMPHOCYTES # BLD MANUAL: 0.87 X10*3/UL (ref 1.2–4.8)
LYMPHOCYTES NFR BLD MANUAL: 8 %
MAGNESIUM SERPL-MCNC: 1.84 MG/DL (ref 1.6–2.4)
MCH RBC QN AUTO: 28.8 PG (ref 26–34)
MCHC RBC AUTO-ENTMCNC: 33.1 G/DL (ref 32–36)
MCV RBC AUTO: 87 FL (ref 80–100)
MONOCYTES # BLD MANUAL: 0.55 X10*3/UL (ref 0.1–1)
MONOCYTES NFR BLD MANUAL: 5 %
NEUTROPHILS # BLD MANUAL: 9.27 X10*3/UL (ref 1.2–7.7)
NEUTS BAND # BLD MANUAL: 2.18 X10*3/UL (ref 0–0.7)
NEUTS BAND NFR BLD MANUAL: 20 %
NEUTS SEG # BLD MANUAL: 7.09 X10*3/UL (ref 1.2–7)
NEUTS SEG NFR BLD MANUAL: 65 %
NRBC BLD-RTO: 0 /100 WBCS (ref 0–0)
PHOSPHATE SERPL-MCNC: 2.4 MG/DL (ref 2.5–4.9)
PLATELET # BLD AUTO: 60 X10*3/UL (ref 150–450)
POLYCHROMASIA BLD QL SMEAR: ABNORMAL
POTASSIUM SERPL-SCNC: 3.8 MMOL/L (ref 3.5–5.3)
PROT SERPL-MCNC: 6.6 G/DL (ref 6.4–8.2)
RBC # BLD AUTO: 3.19 X10*6/UL (ref 4.5–5.9)
RBC MORPH BLD: ABNORMAL
RH FACTOR (ANTIGEN D): NORMAL
RH FACTOR (ANTIGEN D): NORMAL
SODIUM SERPL-SCNC: 135 MMOL/L (ref 136–145)
TARGETS BLD QL SMEAR: ABNORMAL
TOTAL CELLS COUNTED BLD: 100
VARIANT LYMPHS # BLD MANUAL: 0.22 X10*3/UL (ref 0–0.5)
VARIANT LYMPHS NFR BLD: 2 %
WBC # BLD AUTO: 10.9 X10*3/UL (ref 4.4–11.3)

## 2024-10-08 PROCEDURE — 83735 ASSAY OF MAGNESIUM: CPT

## 2024-10-08 PROCEDURE — 2500000004 HC RX 250 GENERAL PHARMACY W/ HCPCS (ALT 636 FOR OP/ED): Performed by: INTERNAL MEDICINE

## 2024-10-08 PROCEDURE — 2500000001 HC RX 250 WO HCPCS SELF ADMINISTERED DRUGS (ALT 637 FOR MEDICARE OP)

## 2024-10-08 PROCEDURE — 86900 BLOOD TYPING SEROLOGIC ABO: CPT

## 2024-10-08 PROCEDURE — 2060000001 HC INTERMEDIATE ICU ROOM DAILY

## 2024-10-08 PROCEDURE — 85007 BL SMEAR W/DIFF WBC COUNT: CPT

## 2024-10-08 PROCEDURE — 36415 COLL VENOUS BLD VENIPUNCTURE: CPT

## 2024-10-08 PROCEDURE — 85027 COMPLETE CBC AUTOMATED: CPT

## 2024-10-08 PROCEDURE — 2500000004 HC RX 250 GENERAL PHARMACY W/ HCPCS (ALT 636 FOR OP/ED)

## 2024-10-08 PROCEDURE — 84100 ASSAY OF PHOSPHORUS: CPT

## 2024-10-08 PROCEDURE — 84075 ASSAY ALKALINE PHOSPHATASE: CPT

## 2024-10-08 ASSESSMENT — COGNITIVE AND FUNCTIONAL STATUS - GENERAL
DAILY ACTIVITIY SCORE: 24
MOBILITY SCORE: 24
DAILY ACTIVITIY SCORE: 24
MOBILITY SCORE: 24

## 2024-10-08 ASSESSMENT — LIFESTYLE VARIABLES
TREMOR: NO TREMOR
AUDITORY DISTURBANCES: NOT PRESENT
VISUAL DISTURBANCES: NOT PRESENT
HEADACHE, FULLNESS IN HEAD: NOT PRESENT
HEADACHE, FULLNESS IN HEAD: NOT PRESENT
PAROXYSMAL SWEATS: NO SWEAT VISIBLE
VISUAL DISTURBANCES: NOT PRESENT
NAUSEA AND VOMITING: NO NAUSEA AND NO VOMITING
TREMOR: NO TREMOR
TOTAL SCORE: 0
TREMOR: NO TREMOR
NAUSEA AND VOMITING: NO NAUSEA AND NO VOMITING
TOTAL SCORE: 0
NAUSEA AND VOMITING: NO NAUSEA AND NO VOMITING
ORIENTATION AND CLOUDING OF SENSORIUM: ORIENTED AND CAN DO SERIAL ADDITIONS
AGITATION: NORMAL ACTIVITY
PAROXYSMAL SWEATS: NO SWEAT VISIBLE
PAROXYSMAL SWEATS: NO SWEAT VISIBLE
AUDITORY DISTURBANCES: NOT PRESENT
HEADACHE, FULLNESS IN HEAD: NOT PRESENT
AGITATION: NORMAL ACTIVITY
TREMOR: NO TREMOR
AGITATION: NORMAL ACTIVITY
AUDITORY DISTURBANCES: NOT PRESENT
PAROXYSMAL SWEATS: NO SWEAT VISIBLE
TOTAL SCORE: 0
AGITATION: NORMAL ACTIVITY
ANXIETY: NO ANXIETY, AT EASE
AUDITORY DISTURBANCES: NOT PRESENT
VISUAL DISTURBANCES: NOT PRESENT
ANXIETY: NO ANXIETY, AT EASE
HEADACHE, FULLNESS IN HEAD: NOT PRESENT
NAUSEA AND VOMITING: NO NAUSEA AND NO VOMITING
ANXIETY: NO ANXIETY, AT EASE
ANXIETY: NO ANXIETY, AT EASE
ORIENTATION AND CLOUDING OF SENSORIUM: ORIENTED AND CAN DO SERIAL ADDITIONS
ORIENTATION AND CLOUDING OF SENSORIUM: ORIENTED AND CAN DO SERIAL ADDITIONS
VISUAL DISTURBANCES: NOT PRESENT

## 2024-10-08 ASSESSMENT — PAIN SCALES - GENERAL
PAINLEVEL_OUTOF10: 0 - NO PAIN
PAINLEVEL_OUTOF10: 3
PAINLEVEL_OUTOF10: 0 - NO PAIN

## 2024-10-08 ASSESSMENT — PAIN - FUNCTIONAL ASSESSMENT
PAIN_FUNCTIONAL_ASSESSMENT: 0-10
PAIN_FUNCTIONAL_ASSESSMENT: 0-10

## 2024-10-08 NOTE — CARE PLAN
The patient's goals for the shift include      The clinical goals for the shift include to remain free from oxygen

## 2024-10-08 NOTE — NURSING NOTE
Assumed care of patient. Patient denies pain  or discomfort. Patient given Incentive spirometer and instructed on use. patient continues to cough having thick sputum. Call light in reach

## 2024-10-08 NOTE — CARE PLAN
The patient's goals for the shift include      The clinical goals for the shift include wean oxygen and keep comfortable      Problem: Pain - Adult  Goal: Verbalizes/displays adequate comfort level or baseline comfort level  Outcome: Progressing     Problem: Safety - Adult  Goal: Free from fall injury  Outcome: Progressing     Problem: Discharge Planning  Goal: Discharge to home or other facility with appropriate resources  Outcome: Progressing     Problem: Chronic Conditions and Co-morbidities  Goal: Patient's chronic conditions and co-morbidity symptoms are monitored and maintained or improved  Outcome: Progressing     Problem: Pain  Goal: Takes deep breaths with improved pain control throughout the shift  Outcome: Progressing  Goal: Turns in bed with improved pain control throughout the shift  Outcome: Progressing  Goal: Walks with improved pain control throughout the shift  Outcome: Progressing  Goal: Performs ADL's with improved pain control throughout shift  Outcome: Progressing  Goal: Participates in PT with improved pain control throughout the shift  Outcome: Progressing  Goal: Free from opioid side effects throughout the shift  Outcome: Progressing  Goal: Free from acute confusion related to pain meds throughout the shift  Outcome: Progressing

## 2024-10-08 NOTE — PROGRESS NOTES
Tyler Hernandez is a 49 y.o. male on day 2 of admission presenting with Sepsis (Multi).      Subjective   Doing okay, stable on RA. C/o constant productive cough.        Objective     Last Recorded Vitals  /63 (BP Location: Left arm, Patient Position: Sitting)   Pulse 97   Temp 36.8 °C (98.2 °F) (Temporal)   Resp 18   Wt 90.2 kg (198 lb 13.7 oz)   SpO2 95%   Intake/Output last 3 Shifts:    Intake/Output Summary (Last 24 hours) at 10/8/2024 1252  Last data filed at 10/8/2024 1109  Gross per 24 hour   Intake 1201.25 ml   Output --   Net 1201.25 ml       Admission Weight  Weight: 90.3 kg (199 lb) (10/06/24 1942)    Daily Weight  10/08/24 : 90.2 kg (198 lb 13.7 oz)    Image Results  US gallbladder  Narrative: Interpreted By:  Marques Peres,   STUDY:  US GALLBLADDER  10/7/2024 7:18 pm      INDICATION:  48 y/o   M with  Signs/Symptoms:Concerns for cholelithatisis on ct.          COMPARISON:  None.      ACCESSION NUMBER(S):  [id number]      ORDERING CLINICIAN:  SHARONA UGARTE      TECHNIQUE:  Routine ultrasound of the right upper quadrant was performed.  Static  images were obtained for remote interpretation.      FINDINGS:  LIVER:  Craniocaudal length:  20 cm,  within normal limits of size for age.  Echogenicity:  Mildly increased  Mass:  None.      BILE DUCTS:  Intrahepatic ducts: Non-dilated.  Common bile duct diameter: 3 mm.      GALLBLADDER:  Gallbladder:  Normal.  Gallstones:  There are multiple stones and sludge noted within the  gallbladder Gallbladder sludge:  See above.  Gallbladder wall thickening:  There is gallbladder wall thickening  noted.. Negative sonographic Rivera's sign. Pericholecystic fluid:  None.      PANCREAS:   Visualized portions are unremarkable.      RIGHT KIDNEY:  Craniocaudal length:  12.1 cm,  within normal limits of size for age.  No hydronephrosis, hydroureter or focal renal lesion.      PERITONEAL FLUID:   There is trace ascites noted in the right upper  quadrant.       Impression: Multiple stones noted within a collapsed gallbladder with wall  thickening.      Fatty infiltration of the enlarged liver.      Trace free fluid in the right upper quadrant.      MACRO:  None      Signed by: Marques Peres 10/7/2024 9:53 PM  Dictation workstation:   [id number]  CT angio chest for pulmonary embolism, CT abdomen pelvis wo IV contrast  Narrative: Interpreted By:  Franco Keene,   STUDY:  CT ABDOMEN PELVIS WO IV CONTRAST; CT ANGIO CHEST FOR PULMONARY  EMBOLISM;  10/7/2024 1:21 am; 10/7/2024 1:22 am      INDICATION:  Signs/Symptoms:Hernia abdominal pain; Signs/Symptoms:SOB.      COMPARISON:  None.      ACCESSION NUMBER(S):  [id number]; [id number]      ORDERING CLINICIAN:  MARIA ALEJANDRA MENDEZ      TECHNIQUE:  Contiguous axial images of the abdomen and pelvis were obtained  without intravenous contrast. Contiguous axial images of the chest  were obtained after the intravenous administration of contrast.  Coronal and sagittal reformatted images were obtained from the axial  images. MIPS of the chest were also performed and reviewed.      FINDINGS:  CT CHEST:      No axillary, mediastinal or hilar lymphadenopathy.      The heart is normal in size. No significant pericardial effusion. No  evidence of acute central, main, lobar or proximal segmental  pulmonary embolism.      There is consolidative opacity in the right upper lobe compatible  with pneumonia. There is pulmonary emphysema. No significant pleural  effusion. No pneumothorax.      Mild degenerative change of the thoracic spine.      CT ABDOMEN AND PELVIS:      Evaluation of the abdominal viscera is limited secondary to lack of  intravenous contrast.      There is hepatic steatosis and nodularity of liver contour compatible  with cirrhosis. Limited evaluation for liver mass on noncontrast  examination. There is cholelithiasis and pericholecystic edema.      There is mild peripancreatic edema.      No splenomegaly.      The adrenal glands  appear unremarkable.      No evidence of renal calculus or hydronephrosis. Evaluation of the  kidneys is otherwise limited secondary lack of intravenous contrast.      There is mild abdominal and pelvic ascites.      There is 5.4 cm x 3.3 cm umbilical hernia with herniation of ascites.      The stomach is underdistended and not well evaluated. No evidence of  bowel obstruction. There is wall thickening of segments of colon.  There is colonic diverticulosis.      Urinary bladder is underdistended and not well evaluated.      There are pars defects and grade 1 anterolisthesis of L5 on S1.      Impression: No evidence of acute pulmonary embolism.      Consolidative opacity in the right upper lobe compatible with  pneumonia.      Hepatic steatosis and liver cirrhosis with mild abdominal and pelvic  ascites. Cholelithiasis and gallbladder wall  thickness/pericholecystic edema may relate to the underlying liver  disease, however clinical correlation recommended if there is concern  for acute cholecystitis in the setting for right upper quadrant pain  and ultrasound may be obtained for further evaluation.      Colonic wall thickening which may be secondary to hypoproteinemia, or  active to ascites or infectious/inflammatory in etiology.      Colonic diverticulosis.      MACRO:  None      Signed by: Franco Keene 10/7/2024 2:38 AM  Dictation workstation:   [id number]      Physical Exam  Constitutional:       Appearance: Normal appearance.   Eyes:      Pupils: Pupils are equal, round, and reactive to light.   Cardiovascular:      Rate and Rhythm: Normal rate and regular rhythm.   Pulmonary:      Breath sounds: Rhonchi present.   Abdominal:      General: Abdomen is flat.      Palpations: Abdomen is soft.   Musculoskeletal:         General: Normal range of motion.      Cervical back: Normal range of motion.   Skin:     General: Skin is warm and dry.   Neurological:      Mental Status: He is alert and oriented to person,  place, and time.           Assessment/Plan      Sepsis d/t pneumonia-Stable on RA, atbs as ordered, monitor respiratory status closely.   ETOH abuse-Encourage ETOH cessation, CIWA.   Electrolyte disturbance-D/t the above, monitor lab work.             Chris Simon MD

## 2024-10-09 ENCOUNTER — APPOINTMENT (OUTPATIENT)
Dept: CARDIOLOGY | Facility: HOSPITAL | Age: 49
End: 2024-10-09
Payer: COMMERCIAL

## 2024-10-09 LAB
ALBUMIN SERPL BCP-MCNC: 2.6 G/DL (ref 3.4–5)
ALP SERPL-CCNC: 80 U/L (ref 33–120)
ALT SERPL W P-5'-P-CCNC: 22 U/L (ref 10–52)
ANION GAP SERPL CALCULATED.3IONS-SCNC: 11 MMOL/L (ref 10–20)
AST SERPL W P-5'-P-CCNC: 61 U/L (ref 9–39)
BASOPHILS # BLD MANUAL: 0.09 X10*3/UL (ref 0–0.1)
BASOPHILS NFR BLD MANUAL: 1 %
BILIRUB SERPL-MCNC: 1.1 MG/DL (ref 0–1.2)
BUN SERPL-MCNC: 9 MG/DL (ref 6–23)
CALCIUM SERPL-MCNC: 8.1 MG/DL (ref 8.6–10.3)
CARDIAC TROPONIN I PNL SERPL HS: 28 NG/L (ref 0–20)
CARDIAC TROPONIN I PNL SERPL HS: 4 NG/L (ref 0–20)
CHLORIDE SERPL-SCNC: 106 MMOL/L (ref 98–107)
CO2 SERPL-SCNC: 22 MMOL/L (ref 21–32)
CREAT SERPL-MCNC: 0.73 MG/DL (ref 0.5–1.3)
DACRYOCYTES BLD QL SMEAR: NORMAL
DOHLE BOD BLD QL SMEAR: PRESENT
EGFRCR SERPLBLD CKD-EPI 2021: >90 ML/MIN/1.73M*2
EOSINOPHIL # BLD MANUAL: 0 X10*3/UL (ref 0–0.7)
EOSINOPHIL NFR BLD MANUAL: 0 %
ERYTHROCYTE [DISTWIDTH] IN BLOOD BY AUTOMATED COUNT: 17.3 % (ref 11.5–14.5)
GLUCOSE SERPL-MCNC: 92 MG/DL (ref 74–99)
HCT VFR BLD AUTO: 28.4 % (ref 41–52)
HGB BLD-MCNC: 9.6 G/DL (ref 13.5–17.5)
IMM GRANULOCYTES # BLD AUTO: 0.07 X10*3/UL (ref 0–0.7)
IMM GRANULOCYTES NFR BLD AUTO: 0.8 % (ref 0–0.9)
LACTATE SERPL-SCNC: 1.2 MMOL/L (ref 0.4–2)
LYMPHOCYTES # BLD MANUAL: 1.44 X10*3/UL (ref 1.2–4.8)
LYMPHOCYTES NFR BLD MANUAL: 16 %
MAGNESIUM SERPL-MCNC: 1.68 MG/DL (ref 1.6–2.4)
MCH RBC QN AUTO: 28.7 PG (ref 26–34)
MCHC RBC AUTO-ENTMCNC: 33.8 G/DL (ref 32–36)
MCV RBC AUTO: 85 FL (ref 80–100)
MONOCYTES # BLD MANUAL: 0.45 X10*3/UL (ref 0.1–1)
MONOCYTES NFR BLD MANUAL: 5 %
NEUTROPHILS # BLD MANUAL: 6.93 X10*3/UL (ref 1.2–7.7)
NEUTS BAND # BLD MANUAL: 0.09 X10*3/UL (ref 0–0.7)
NEUTS BAND NFR BLD MANUAL: 1 %
NEUTS SEG # BLD MANUAL: 6.84 X10*3/UL (ref 1.2–7)
NEUTS SEG NFR BLD MANUAL: 76 %
NRBC BLD-RTO: 0.3 /100 WBCS (ref 0–0)
PHOSPHATE SERPL-MCNC: 3.7 MG/DL (ref 2.5–4.9)
PLATELET # BLD AUTO: 60 X10*3/UL (ref 150–450)
POLYCHROMASIA BLD QL SMEAR: NORMAL
POTASSIUM SERPL-SCNC: 3.6 MMOL/L (ref 3.5–5.3)
PROT SERPL-MCNC: 6.6 G/DL (ref 6.4–8.2)
RBC # BLD AUTO: 3.34 X10*6/UL (ref 4.5–5.9)
RBC MORPH BLD: NORMAL
SODIUM SERPL-SCNC: 135 MMOL/L (ref 136–145)
TARGETS BLD QL SMEAR: NORMAL
TOTAL CELLS COUNTED BLD: 100
VARIANT LYMPHS # BLD MANUAL: 0.09 X10*3/UL (ref 0–0.5)
VARIANT LYMPHS NFR BLD: 1 %
WBC # BLD AUTO: 9 X10*3/UL (ref 4.4–11.3)

## 2024-10-09 PROCEDURE — 83735 ASSAY OF MAGNESIUM: CPT

## 2024-10-09 PROCEDURE — 2060000001 HC INTERMEDIATE ICU ROOM DAILY

## 2024-10-09 PROCEDURE — 83605 ASSAY OF LACTIC ACID: CPT | Performed by: INTERNAL MEDICINE

## 2024-10-09 PROCEDURE — 85007 BL SMEAR W/DIFF WBC COUNT: CPT

## 2024-10-09 PROCEDURE — 36415 COLL VENOUS BLD VENIPUNCTURE: CPT | Performed by: INTERNAL MEDICINE

## 2024-10-09 PROCEDURE — 84484 ASSAY OF TROPONIN QUANT: CPT | Performed by: INTERNAL MEDICINE

## 2024-10-09 PROCEDURE — 80053 COMPREHEN METABOLIC PANEL: CPT

## 2024-10-09 PROCEDURE — 93005 ELECTROCARDIOGRAM TRACING: CPT

## 2024-10-09 PROCEDURE — 85027 COMPLETE CBC AUTOMATED: CPT

## 2024-10-09 PROCEDURE — 93010 ELECTROCARDIOGRAM REPORT: CPT | Performed by: INTERNAL MEDICINE

## 2024-10-09 PROCEDURE — 36415 COLL VENOUS BLD VENIPUNCTURE: CPT

## 2024-10-09 PROCEDURE — 2500000004 HC RX 250 GENERAL PHARMACY W/ HCPCS (ALT 636 FOR OP/ED): Performed by: INTERNAL MEDICINE

## 2024-10-09 PROCEDURE — 84100 ASSAY OF PHOSPHORUS: CPT

## 2024-10-09 PROCEDURE — 2500000001 HC RX 250 WO HCPCS SELF ADMINISTERED DRUGS (ALT 637 FOR MEDICARE OP)

## 2024-10-09 PROCEDURE — 2500000004 HC RX 250 GENERAL PHARMACY W/ HCPCS (ALT 636 FOR OP/ED)

## 2024-10-09 RX ORDER — TRAMADOL HYDROCHLORIDE 50 MG/1
50 TABLET ORAL EVERY 6 HOURS PRN
Status: DISCONTINUED | OUTPATIENT
Start: 2024-10-09 | End: 2024-10-12 | Stop reason: HOSPADM

## 2024-10-09 ASSESSMENT — LIFESTYLE VARIABLES
TREMOR: NO TREMOR
PAROXYSMAL SWEATS: NO SWEAT VISIBLE
TOTAL SCORE: 0
HEADACHE, FULLNESS IN HEAD: NOT PRESENT
AGITATION: NORMAL ACTIVITY
VISUAL DISTURBANCES: NOT PRESENT
HEADACHE, FULLNESS IN HEAD: NOT PRESENT
PAROXYSMAL SWEATS: NO SWEAT VISIBLE
ANXIETY: MILDLY ANXIOUS
AGITATION: NORMAL ACTIVITY
ANXIETY: NO ANXIETY, AT EASE
AGITATION: NORMAL ACTIVITY
ORIENTATION AND CLOUDING OF SENSORIUM: ORIENTED AND CAN DO SERIAL ADDITIONS
TOTAL SCORE: 1
NAUSEA AND VOMITING: NO NAUSEA AND NO VOMITING
NAUSEA AND VOMITING: NO NAUSEA AND NO VOMITING
TREMOR: NO TREMOR
VISUAL DISTURBANCES: NOT PRESENT
ANXIETY: NO ANXIETY, AT EASE
NAUSEA AND VOMITING: NO NAUSEA AND NO VOMITING
AUDITORY DISTURBANCES: NOT PRESENT
ANXIETY: NO ANXIETY, AT EASE
AGITATION: NORMAL ACTIVITY
AUDITORY DISTURBANCES: NOT PRESENT
AUDITORY DISTURBANCES: NOT PRESENT
TOTAL SCORE: 0
NAUSEA AND VOMITING: NO NAUSEA AND NO VOMITING
HEADACHE, FULLNESS IN HEAD: NOT PRESENT
ORIENTATION AND CLOUDING OF SENSORIUM: ORIENTED AND CAN DO SERIAL ADDITIONS
PAROXYSMAL SWEATS: NO SWEAT VISIBLE
VISUAL DISTURBANCES: NOT PRESENT
AGITATION: NORMAL ACTIVITY
ORIENTATION AND CLOUDING OF SENSORIUM: ORIENTED AND CAN DO SERIAL ADDITIONS
TREMOR: NO TREMOR
AUDITORY DISTURBANCES: NOT PRESENT
HEADACHE, FULLNESS IN HEAD: VERY MILD
VISUAL DISTURBANCES: NOT PRESENT
TREMOR: NO TREMOR
TOTAL SCORE: 0
NAUSEA AND VOMITING: NO NAUSEA AND NO VOMITING
TREMOR: NO TREMOR
VISUAL DISTURBANCES: NOT PRESENT
PAROXYSMAL SWEATS: NO SWEAT VISIBLE
HEADACHE, FULLNESS IN HEAD: NOT PRESENT
ORIENTATION AND CLOUDING OF SENSORIUM: ORIENTED AND CAN DO SERIAL ADDITIONS
TOTAL SCORE: 1
ANXIETY: NO ANXIETY, AT EASE
ORIENTATION AND CLOUDING OF SENSORIUM: ORIENTED AND CAN DO SERIAL ADDITIONS
AUDITORY DISTURBANCES: NOT PRESENT
PAROXYSMAL SWEATS: NO SWEAT VISIBLE

## 2024-10-09 ASSESSMENT — PAIN - FUNCTIONAL ASSESSMENT
PAIN_FUNCTIONAL_ASSESSMENT: 0-10

## 2024-10-09 ASSESSMENT — COGNITIVE AND FUNCTIONAL STATUS - GENERAL: MOBILITY SCORE: 24

## 2024-10-09 ASSESSMENT — PAIN SCALES - GENERAL
PAINLEVEL_OUTOF10: 0 - NO PAIN

## 2024-10-09 NOTE — NURSING NOTE
Assumed care of pt, pt is awake lying in bed, pt states chest pain, on RA, notified MD, HR is 86 SR on tele, bedside report given by JOHANA Yee, bed locked and lowered, call light w/in reach.

## 2024-10-09 NOTE — PROGRESS NOTES
10/09/24 0826   Discharge Planning   Expected Discharge Disposition Home     No skilled needs

## 2024-10-09 NOTE — NURSING NOTE
Received report from Lucila. Introduced self to pt as RN, vitals obtained and stable.   Pt is alert and oriented x 4. Pt has no complaints or needs at this time. Bed in lowest position, side rails up x 3, call light, bedside table and room phone within reach. Patient does not have a history of falls. I did complete a risk assessment for falls. A plan of care for falls was documented. Pt educated the importance of calling for help before getting out of bed by using the call light given. Assessment and medication administration as charted. Pt currently has IV fluids NS running through @75ml/hr through a# 20 in RAC. Will continue to monitor. Care plan is ongoing.

## 2024-10-09 NOTE — CARE PLAN
The patient's goals for the shift include      The clinical goals for the shift include pt will remain free of falls and injuries      Problem: Pain - Adult  Goal: Verbalizes/displays adequate comfort level or baseline comfort level  Outcome: Progressing     Problem: Safety - Adult  Goal: Free from fall injury  Outcome: Progressing     Problem: Discharge Planning  Goal: Discharge to home or other facility with appropriate resources  Outcome: Progressing     Problem: Chronic Conditions and Co-morbidities  Goal: Patient's chronic conditions and co-morbidity symptoms are monitored and maintained or improved  Outcome: Progressing     Problem: Pain  Goal: Takes deep breaths with improved pain control throughout the shift  Outcome: Progressing  Goal: Turns in bed with improved pain control throughout the shift  Outcome: Progressing  Goal: Walks with improved pain control throughout the shift  Outcome: Progressing  Goal: Performs ADL's with improved pain control throughout shift  Outcome: Progressing  Goal: Participates in PT with improved pain control throughout the shift  Outcome: Progressing  Goal: Free from opioid side effects throughout the shift  Outcome: Progressing  Goal: Free from acute confusion related to pain meds throughout the shift  Outcome: Progressing     Problem: Skin  Goal: Decreased wound size/increased tissue granulation at next dressing change  Outcome: Progressing  Goal: Participates in plan/prevention/treatment measures  Outcome: Progressing  Goal: Prevent/manage excess moisture  Outcome: Progressing  Goal: Prevent/minimize sheer/friction injuries  Outcome: Progressing  Goal: Promote/optimize nutrition  Outcome: Progressing  Goal: Promote skin healing  Outcome: Progressing     Problem: Fall/Injury  Goal: Not fall by end of shift  Outcome: Progressing  Goal: Be free from injury by end of the shift  Outcome: Progressing  Goal: Verbalize understanding of personal risk factors for fall in the  hospital  Outcome: Progressing  Goal: Verbalize understanding of risk factor reduction measures to prevent injury from fall in the home  Outcome: Progressing  Goal: Use assistive devices by end of the shift  Outcome: Progressing  Goal: Pace activities to prevent fatigue by end of the shift  Outcome: Progressing

## 2024-10-09 NOTE — CARE PLAN
The patient's goals for the shift include      The clinical goals for the shift include pt safety

## 2024-10-10 LAB
ALBUMIN SERPL BCP-MCNC: 2.4 G/DL (ref 3.4–5)
ALBUMIN SERPL BCP-MCNC: 2.8 G/DL (ref 3.4–5)
ALP SERPL-CCNC: 78 U/L (ref 33–120)
ALP SERPL-CCNC: 86 U/L (ref 33–120)
ALT SERPL W P-5'-P-CCNC: 23 U/L (ref 10–52)
ALT SERPL W P-5'-P-CCNC: 26 U/L (ref 10–52)
ANION GAP SERPL CALCULATED.3IONS-SCNC: 11 MMOL/L (ref 10–20)
ANION GAP SERPL CALCULATED.3IONS-SCNC: 8 MMOL/L (ref 10–20)
AST SERPL W P-5'-P-CCNC: 57 U/L (ref 9–39)
AST SERPL W P-5'-P-CCNC: 63 U/L (ref 9–39)
BASOPHILS # BLD AUTO: 0.06 X10*3/UL (ref 0–0.1)
BASOPHILS NFR BLD AUTO: 1.1 %
BILIRUB SERPL-MCNC: 1 MG/DL (ref 0–1.2)
BILIRUB SERPL-MCNC: 1.3 MG/DL (ref 0–1.2)
BUN SERPL-MCNC: 10 MG/DL (ref 6–23)
BUN SERPL-MCNC: 9 MG/DL (ref 6–23)
CALCIUM SERPL-MCNC: 7.8 MG/DL (ref 8.6–10.3)
CALCIUM SERPL-MCNC: 8.2 MG/DL (ref 8.6–10.3)
CHLORIDE SERPL-SCNC: 103 MMOL/L (ref 98–107)
CHLORIDE SERPL-SCNC: 105 MMOL/L (ref 98–107)
CO2 SERPL-SCNC: 21 MMOL/L (ref 21–32)
CO2 SERPL-SCNC: 24 MMOL/L (ref 21–32)
CREAT SERPL-MCNC: 0.76 MG/DL (ref 0.5–1.3)
CREAT SERPL-MCNC: 0.77 MG/DL (ref 0.5–1.3)
DACRYOCYTES BLD QL SMEAR: NORMAL
DOHLE BOD BLD QL SMEAR: PRESENT
EGFRCR SERPLBLD CKD-EPI 2021: >90 ML/MIN/1.73M*2
EGFRCR SERPLBLD CKD-EPI 2021: >90 ML/MIN/1.73M*2
EOSINOPHIL # BLD AUTO: 0.08 X10*3/UL (ref 0–0.7)
EOSINOPHIL NFR BLD AUTO: 1.5 %
ERYTHROCYTE [DISTWIDTH] IN BLOOD BY AUTOMATED COUNT: 17.2 % (ref 11.5–14.5)
ERYTHROCYTE [DISTWIDTH] IN BLOOD BY AUTOMATED COUNT: 17.2 % (ref 11.5–14.5)
GLUCOSE SERPL-MCNC: 152 MG/DL (ref 74–99)
GLUCOSE SERPL-MCNC: 85 MG/DL (ref 74–99)
HCT VFR BLD AUTO: 27.9 % (ref 41–52)
HCT VFR BLD AUTO: 30.1 % (ref 41–52)
HGB BLD-MCNC: 10 G/DL (ref 13.5–17.5)
HGB BLD-MCNC: 9.2 G/DL (ref 13.5–17.5)
IMM GRANULOCYTES # BLD AUTO: 0.14 X10*3/UL (ref 0–0.7)
IMM GRANULOCYTES NFR BLD AUTO: 2.6 % (ref 0–0.9)
LYMPHOCYTES # BLD AUTO: 1.21 X10*3/UL (ref 1.2–4.8)
LYMPHOCYTES NFR BLD AUTO: 22.3 %
MAGNESIUM SERPL-MCNC: 1.46 MG/DL (ref 1.6–2.4)
MCH RBC QN AUTO: 28.6 PG (ref 26–34)
MCH RBC QN AUTO: 28.9 PG (ref 26–34)
MCHC RBC AUTO-ENTMCNC: 33 G/DL (ref 32–36)
MCHC RBC AUTO-ENTMCNC: 33.2 G/DL (ref 32–36)
MCV RBC AUTO: 87 FL (ref 80–100)
MCV RBC AUTO: 87 FL (ref 80–100)
MONOCYTES # BLD AUTO: 0.91 X10*3/UL (ref 0.1–1)
MONOCYTES NFR BLD AUTO: 16.8 %
NEUTROPHILS # BLD AUTO: 3.02 X10*3/UL (ref 1.2–7.7)
NEUTROPHILS NFR BLD AUTO: 55.7 %
NRBC BLD-RTO: 1.5 /100 WBCS (ref 0–0)
NRBC BLD-RTO: 2 /100 WBCS (ref 0–0)
OVALOCYTES BLD QL SMEAR: NORMAL
PHOSPHATE SERPL-MCNC: 3.7 MG/DL (ref 2.5–4.9)
PLATELET # BLD AUTO: 55 X10*3/UL (ref 150–450)
PLATELET # BLD AUTO: 55 X10*3/UL (ref 150–450)
POLYCHROMASIA BLD QL SMEAR: NORMAL
POTASSIUM SERPL-SCNC: 3.7 MMOL/L (ref 3.5–5.3)
POTASSIUM SERPL-SCNC: 3.9 MMOL/L (ref 3.5–5.3)
PROT SERPL-MCNC: 6.1 G/DL (ref 6.4–8.2)
PROT SERPL-MCNC: 7 G/DL (ref 6.4–8.2)
RBC # BLD AUTO: 3.22 X10*6/UL (ref 4.5–5.9)
RBC # BLD AUTO: 3.46 X10*6/UL (ref 4.5–5.9)
RBC MORPH BLD: NORMAL
SODIUM SERPL-SCNC: 131 MMOL/L (ref 136–145)
SODIUM SERPL-SCNC: 133 MMOL/L (ref 136–145)
TARGETS BLD QL SMEAR: NORMAL
WBC # BLD AUTO: 5.4 X10*3/UL (ref 4.4–11.3)
WBC # BLD AUTO: 5.6 X10*3/UL (ref 4.4–11.3)

## 2024-10-10 PROCEDURE — 2500000001 HC RX 250 WO HCPCS SELF ADMINISTERED DRUGS (ALT 637 FOR MEDICARE OP)

## 2024-10-10 PROCEDURE — 84100 ASSAY OF PHOSPHORUS: CPT

## 2024-10-10 PROCEDURE — 36415 COLL VENOUS BLD VENIPUNCTURE: CPT | Performed by: INTERNAL MEDICINE

## 2024-10-10 PROCEDURE — 80053 COMPREHEN METABOLIC PANEL: CPT

## 2024-10-10 PROCEDURE — 85027 COMPLETE CBC AUTOMATED: CPT | Performed by: INTERNAL MEDICINE

## 2024-10-10 PROCEDURE — 2500000004 HC RX 250 GENERAL PHARMACY W/ HCPCS (ALT 636 FOR OP/ED): Performed by: INTERNAL MEDICINE

## 2024-10-10 PROCEDURE — 2060000001 HC INTERMEDIATE ICU ROOM DAILY

## 2024-10-10 PROCEDURE — 2500000004 HC RX 250 GENERAL PHARMACY W/ HCPCS (ALT 636 FOR OP/ED)

## 2024-10-10 PROCEDURE — 85025 COMPLETE CBC W/AUTO DIFF WBC: CPT

## 2024-10-10 PROCEDURE — 84075 ASSAY ALKALINE PHOSPHATASE: CPT | Performed by: INTERNAL MEDICINE

## 2024-10-10 PROCEDURE — 36415 COLL VENOUS BLD VENIPUNCTURE: CPT

## 2024-10-10 PROCEDURE — 83735 ASSAY OF MAGNESIUM: CPT

## 2024-10-10 PROCEDURE — 87449 NOS EACH ORGANISM AG IA: CPT | Mod: WESLAB | Performed by: INTERNAL MEDICINE

## 2024-10-10 PROCEDURE — 87899 AGENT NOS ASSAY W/OPTIC: CPT | Mod: WESLAB | Performed by: INTERNAL MEDICINE

## 2024-10-10 RX ORDER — GUAIFENESIN/DEXTROMETHORPHAN 100-10MG/5
5 SYRUP ORAL EVERY 4 HOURS PRN
Status: DISCONTINUED | OUTPATIENT
Start: 2024-10-10 | End: 2024-10-12 | Stop reason: HOSPADM

## 2024-10-10 RX ORDER — ALPRAZOLAM 0.5 MG/1
0.5 TABLET ORAL 2 TIMES DAILY PRN
Status: DISCONTINUED | OUTPATIENT
Start: 2024-10-10 | End: 2024-10-12 | Stop reason: HOSPADM

## 2024-10-10 RX ORDER — ALUMINUM HYDROXIDE, MAGNESIUM HYDROXIDE, AND SIMETHICONE 1200; 120; 1200 MG/30ML; MG/30ML; MG/30ML
20 SUSPENSION ORAL 3 TIMES DAILY PRN
Status: DISCONTINUED | OUTPATIENT
Start: 2024-10-10 | End: 2024-10-12 | Stop reason: HOSPADM

## 2024-10-10 RX ORDER — ONDANSETRON HYDROCHLORIDE 2 MG/ML
4 INJECTION, SOLUTION INTRAVENOUS EVERY 4 HOURS PRN
Status: DISCONTINUED | OUTPATIENT
Start: 2024-10-10 | End: 2024-10-12 | Stop reason: HOSPADM

## 2024-10-10 RX ORDER — ACETAMINOPHEN 325 MG/1
650 TABLET ORAL EVERY 6 HOURS PRN
Status: DISCONTINUED | OUTPATIENT
Start: 2024-10-10 | End: 2024-10-10 | Stop reason: SDUPTHER

## 2024-10-10 RX ORDER — BISACODYL 5 MG
10 TABLET, DELAYED RELEASE (ENTERIC COATED) ORAL DAILY PRN
Status: DISCONTINUED | OUTPATIENT
Start: 2024-10-10 | End: 2024-10-12 | Stop reason: HOSPADM

## 2024-10-10 RX ORDER — TALC
6 POWDER (GRAM) TOPICAL NIGHTLY PRN
Status: DISCONTINUED | OUTPATIENT
Start: 2024-10-10 | End: 2024-10-12 | Stop reason: HOSPADM

## 2024-10-10 RX ORDER — DIPHENHYDRAMINE HCL 25 MG
25 TABLET ORAL 2 TIMES DAILY PRN
Status: DISCONTINUED | OUTPATIENT
Start: 2024-10-10 | End: 2024-10-12 | Stop reason: HOSPADM

## 2024-10-10 ASSESSMENT — COGNITIVE AND FUNCTIONAL STATUS - GENERAL
MOBILITY SCORE: 24
DAILY ACTIVITIY SCORE: 24

## 2024-10-10 ASSESSMENT — LIFESTYLE VARIABLES
HEADACHE, FULLNESS IN HEAD: NOT PRESENT
HEADACHE, FULLNESS IN HEAD: NOT PRESENT
NAUSEA AND VOMITING: NO NAUSEA AND NO VOMITING
AUDITORY DISTURBANCES: NOT PRESENT
AGITATION: NORMAL ACTIVITY
TREMOR: NO TREMOR
VISUAL DISTURBANCES: NOT PRESENT
AUDITORY DISTURBANCES: NOT PRESENT
NAUSEA AND VOMITING: NO NAUSEA AND NO VOMITING
ANXIETY: NO ANXIETY, AT EASE
ORIENTATION AND CLOUDING OF SENSORIUM: ORIENTED AND CAN DO SERIAL ADDITIONS
PAROXYSMAL SWEATS: NO SWEAT VISIBLE
VISUAL DISTURBANCES: NOT PRESENT
PAROXYSMAL SWEATS: NO SWEAT VISIBLE
AGITATION: NORMAL ACTIVITY
TREMOR: NO TREMOR
ANXIETY: NO ANXIETY, AT EASE
TOTAL SCORE: 0
ORIENTATION AND CLOUDING OF SENSORIUM: ORIENTED AND CAN DO SERIAL ADDITIONS
TOTAL SCORE: 0

## 2024-10-10 ASSESSMENT — ENCOUNTER SYMPTOMS
JOINT SWELLING: 1
COUGH: 1
FEVER: 1
CHILLS: 1

## 2024-10-10 ASSESSMENT — PAIN SCALES - GENERAL
PAINLEVEL_OUTOF10: 0 - NO PAIN
PAINLEVEL_OUTOF10: 0 - NO PAIN

## 2024-10-10 NOTE — PROGRESS NOTES
Tyler Hernandez is a 49 y.o. male on day 4 of admission presenting with Sepsis (Multi).      Subjective   Tyler Hernandez is a 49 y.o. year old male patient with no known Past Medical History. He presented to the ER due to flu like symptoms that started this past Tuesday. He says the symptoms have been on and off since then. He has chills, fevers, cough and fatigue. He also states his grandchildren were recently over and had the same type of symptoms. 3L NS given. Lactate is uptrending from 5.3 -> 8.     # Patient seen at bedside.   # Events from the last visit reviewed.   # Discussed with staff.   # Results of tests and investigations from last visit reviewed and discussed with patient/Family.  #  Electronic chart reviewed.   # Input / Recommendations  from other care providers appreciated and reviewed.  # Some parts of this chart are copied from previous encounters, pertinent changes from today are updated.  # Parts of this chart have been completed using voice recognition software. Please excuse any errors of transcription.      Objective     Last Recorded Vitals  /57 (BP Location: Left arm, Patient Position: Sitting)   Pulse 81   Temp 37.6 °C (99.7 °F) (Temporal)   Resp 17   Wt 93.8 kg (206 lb 12.7 oz)   SpO2 95%   Intake/Output last 3 Shifts:    Intake/Output Summary (Last 24 hours) at 10/10/2024 1725  Last data filed at 10/10/2024 0900  Gross per 24 hour   Intake 1596.25 ml   Output --   Net 1596.25 ml       Admission Weight  Weight: 90.3 kg (199 lb) (10/06/24 1942)    Daily Weight  10/10/24 : 93.8 kg (206 lb 12.7 oz)    Image Results  Electrocardiogram, 12-lead PRN ACS symptoms  Sinus rhythm with Premature atrial complexes  Otherwise normal ECG  No previous ECGs available      Physical Exam    Constitutional:       Appearance: Normal appearance.   Eyes:      Pupils: Pupils are equal, round, and reactive to light.   Cardiovascular:      Rate and Rhythm: Normal rate and regular rhythm.   Pulmonary:       Breath sounds: Rhonchi present.   Abdominal:      General: Abdomen is flat.      Palpations: Abdomen is soft.   Musculoskeletal:         General: Normal range of motion.      Cervical back: Normal range of motion.   Skin:     General: Skin is warm and dry.   Neurological:      Mental Status: He is alert and oriented to person, place, and time.     Relevant Results  Results for orders placed or performed during the hospital encounter of 10/06/24 (from the past 24 hour(s))   Troponin I, High Sensitivity   Result Value Ref Range    Troponin I, High Sensitivity 4 0 - 20 ng/L   CBC and Auto Differential   Result Value Ref Range    WBC 5.4 4.4 - 11.3 x10*3/uL    nRBC 1.5 (H) 0.0 - 0.0 /100 WBCs    RBC 3.22 (L) 4.50 - 5.90 x10*6/uL    Hemoglobin 9.2 (L) 13.5 - 17.5 g/dL    Hematocrit 27.9 (L) 41.0 - 52.0 %    MCV 87 80 - 100 fL    MCH 28.6 26.0 - 34.0 pg    MCHC 33.0 32.0 - 36.0 g/dL    RDW 17.2 (H) 11.5 - 14.5 %    Platelets 55 (L) 150 - 450 x10*3/uL    Neutrophils % 55.7 40.0 - 80.0 %    Immature Granulocytes %, Automated 2.6 (H) 0.0 - 0.9 %    Lymphocytes % 22.3 13.0 - 44.0 %    Monocytes % 16.8 2.0 - 10.0 %    Eosinophils % 1.5 0.0 - 6.0 %    Basophils % 1.1 0.0 - 2.0 %    Neutrophils Absolute 3.02 1.20 - 7.70 x10*3/uL    Immature Granulocytes Absolute, Automated 0.14 0.00 - 0.70 x10*3/uL    Lymphocytes Absolute 1.21 1.20 - 4.80 x10*3/uL    Monocytes Absolute 0.91 0.10 - 1.00 x10*3/uL    Eosinophils Absolute 0.08 0.00 - 0.70 x10*3/uL    Basophils Absolute 0.06 0.00 - 0.10 x10*3/uL   Comprehensive Metabolic Panel   Result Value Ref Range    Glucose 85 74 - 99 mg/dL    Sodium 133 (L) 136 - 145 mmol/L    Potassium 3.7 3.5 - 5.3 mmol/L    Chloride 105 98 - 107 mmol/L    Bicarbonate 24 21 - 32 mmol/L    Anion Gap 8 (L) 10 - 20 mmol/L    Urea Nitrogen 10 6 - 23 mg/dL    Creatinine 0.76 0.50 - 1.30 mg/dL    eGFR >90 >60 mL/min/1.73m*2    Calcium 7.8 (L) 8.6 - 10.3 mg/dL    Albumin 2.4 (L) 3.4 - 5.0 g/dL    Alkaline  Phosphatase 78 33 - 120 U/L    Total Protein 6.1 (L) 6.4 - 8.2 g/dL    AST 57 (H) 9 - 39 U/L    Bilirubin, Total 1.0 0.0 - 1.2 mg/dL    ALT 23 10 - 52 U/L   Magnesium   Result Value Ref Range    Magnesium 1.46 (L) 1.60 - 2.40 mg/dL   Phosphorus   Result Value Ref Range    Phosphorus 3.7 2.5 - 4.9 mg/dL   Morphology   Result Value Ref Range    RBC Morphology See Below     Polychromasia Mild     Target Cells Few     Ovalocytes Few     Teardrop Cells Few     Dohle Bodies Present          Assessment/Plan        # Sepsis d/t pneumonia - improving slowly with atbs  - CT : Consolidative opacity in the right upper lobe compatible with  pneumonia.  # ETOH abuse-Encourage ETOH cessation, CIWA.   # Electrolyte disturbance-D/t the above, monitor lab work.   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

## 2024-10-10 NOTE — CARE PLAN
Problem: Pain - Adult  Goal: Verbalizes/displays adequate comfort level or baseline comfort level  Outcome: Progressing     Problem: Safety - Adult  Goal: Free from fall injury  Outcome: Progressing     Problem: Discharge Planning  Goal: Discharge to home or other facility with appropriate resources  Outcome: Progressing     Problem: Chronic Conditions and Co-morbidities  Goal: Patient's chronic conditions and co-morbidity symptoms are monitored and maintained or improved  Outcome: Progressing     Problem: Pain  Goal: Takes deep breaths with improved pain control throughout the shift  Outcome: Progressing  Goal: Turns in bed with improved pain control throughout the shift  Outcome: Progressing  Goal: Walks with improved pain control throughout the shift  Outcome: Progressing  Goal: Performs ADL's with improved pain control throughout shift  Outcome: Progressing  Goal: Participates in PT with improved pain control throughout the shift  Outcome: Progressing  Goal: Free from opioid side effects throughout the shift  Outcome: Progressing  Goal: Free from acute confusion related to pain meds throughout the shift  Outcome: Progressing     Problem: Skin  Goal: Decreased wound size/increased tissue granulation at next dressing change  Outcome: Progressing  Flowsheets (Taken 10/9/2024 2226)  Decreased wound size/increased tissue granulation at next dressing change: Protective dressings over bony prominences  Goal: Participates in plan/prevention/treatment measures  Outcome: Progressing  Goal: Prevent/manage excess moisture  Outcome: Progressing  Goal: Prevent/minimize sheer/friction injuries  Outcome: Progressing  Goal: Promote/optimize nutrition  Outcome: Progressing  Flowsheets (Taken 10/9/2024 2226)  Promote/optimize nutrition: Assist with feeding  Goal: Promote skin healing  Outcome: Progressing     Problem: Fall/Injury  Goal: Not fall by end of shift  Outcome: Progressing  Goal: Be free from injury by end of the  shift  Outcome: Progressing  Goal: Verbalize understanding of personal risk factors for fall in the hospital  Outcome: Progressing  Goal: Verbalize understanding of risk factor reduction measures to prevent injury from fall in the home  Outcome: Progressing  Goal: Use assistive devices by end of the shift  Outcome: Progressing  Goal: Pace activities to prevent fatigue by end of the shift  Outcome: Progressing

## 2024-10-10 NOTE — PROGRESS NOTES
Tyler Hernandez is a 49 y.o. male on day 4 of admission presenting with Sepsis (Multi).      Subjective   Reports right sided chest pain. Still with low grade fever.       Objective     Last Recorded Vitals  /78 (BP Location: Left arm, Patient Position: Lying)   Pulse 90   Temp 37.6 °C (99.7 °F) (Temporal)   Resp 18   Wt 90.3 kg (199 lb 1.2 oz)   SpO2 96%   Intake/Output last 3 Shifts:    Intake/Output Summary (Last 24 hours) at 10/10/2024 0325  Last data filed at 10/9/2024 2331  Gross per 24 hour   Intake 1807.5 ml   Output --   Net 1807.5 ml       Admission Weight  Weight: 90.3 kg (199 lb) (10/06/24 1942)    Daily Weight  10/09/24 : 90.3 kg (199 lb 1.2 oz)    Image Results  Electrocardiogram, 12-lead PRN ACS symptoms  Sinus rhythm with Premature atrial complexes  Otherwise normal ECG  No previous ECGs available      Physical Exam  Constitutional:       Appearance: Normal appearance.   Eyes:      Pupils: Pupils are equal, round, and reactive to light.   Cardiovascular:      Rate and Rhythm: Normal rate and regular rhythm.   Pulmonary:      Breath sounds: Rhonchi present.   Abdominal:      General: Abdomen is flat.      Palpations: Abdomen is soft.   Musculoskeletal:         General: Normal range of motion.      Cervical back: Normal range of motion.   Skin:     General: Skin is warm and dry.   Neurological:      Mental Status: He is alert and oriented to person, place, and time.           Assessment/Plan      Sepsis d/t pneumonia - improving slowly with atbs  ETOH abuse-Encourage ETOH cessation, CIWA.   Electrolyte disturbance-D/t the above, monitor lab work.   Chest pain - atypical, EKG and troponin negative, analgesics as needed            Chris Simon MD

## 2024-10-10 NOTE — CONSULTS
Inpatient consult to Infectious Diseases  Consult performed by: Fritz Romero MD  Consult ordered by: Chris Simon MD            Primary MD: Vasu Reed MD (Inactive)    Reason For Consult  Pneumonia     History Of Present Illness  Tyler Hernandez is a 49 y.o. male presenting with cough and congestion for about a week.  He reports developing flulike symptoms about a week ago.  Symptoms have been intermittent.  He reports having fever, chills, productive cough and fatigue.  He reports sick contacts.  He came to the hospital for further evaluation and management.  His workup was remarkable for pneumonia.  Was placed on ceftriaxone and azithromycin.  Reports interval improvement, but reports episode of low-grade fever and right anterior knee swelling.  CT chest, reviewed by me and was remarkable for right lower lobe pneumonia.       Past Medical History  He has no past medical history on file.    Surgical History  He has no past surgical history on file.     Social History     Occupational History    Not on file   Tobacco Use    Smoking status: Every Day     Current packs/day: 1.00     Types: Cigarettes    Smokeless tobacco: Never   Vaping Use    Vaping status: Former   Substance and Sexual Activity    Alcohol use: Not on file    Drug use: Yes     Types: Marijuana    Sexual activity: Not on file     Travel History   Travel since 09/10/24    No documented travel since 09/10/24           Family History  No family history on file.  Allergies  Tetracyclines       There is no immunization history on file for this patient.  Medications  Home medications:  No medications prior to admission.     Current medications:  Scheduled medications  azithromycin, 500 mg, oral, q24h BROOKE  cefTRIAXone, 1 g, intravenous, q24h  folic acid, 1 mg, oral, Daily  multivitamin with minerals, 1 tablet, oral, Daily  PHENobarbitaL, 32.4 mg, oral, TID  thiamine, 100 mg, oral, Daily      Continuous medications  sodium chloride  0.9%, 75 mL/hr, Last Rate: 75 mL/hr (10/09/24 1633)      PRN medications  PRN medications: acetaminophen **OR** acetaminophen, loperamide, ondansetron, traMADol    Review of Systems   Constitutional:  Positive for chills and fever.   Respiratory:  Positive for cough.    Musculoskeletal:  Positive for joint swelling.   All other systems reviewed and are negative.       Objective  Range of Vitals (last 24 hours)  Heart Rate:  [75-90]   Temp:  [36.8 °C (98.2 °F)-37.9 °C (100.2 °F)]   Resp:  [16-18]   BP: (104-128)/(55-78)   Weight:  [93.7 kg (206 lb 9.1 oz)-93.8 kg (206 lb 12.7 oz)]   SpO2:  [92 %-97 %]   Daily Weight  10/10/24 : 93.8 kg (206 lb 12.7 oz)    Body mass index is 26.54 kg/m².     Physical Exam  Constitutional:       Appearance: Normal appearance.   HENT:      Head: Normocephalic and atraumatic.      Nose: Nose normal.   Eyes:      General: No scleral icterus.     Extraocular Movements: Extraocular movements intact.      Conjunctiva/sclera: Conjunctivae normal.   Cardiovascular:      Rate and Rhythm: Normal rate and regular rhythm.   Pulmonary:      Breath sounds: Decreased breath sounds present.   Abdominal:      General: Bowel sounds are normal.      Palpations: Abdomen is soft.   Musculoskeletal:      Cervical back: Normal range of motion and neck supple.      Right knee: Swelling and erythema present.      Right lower leg: No edema.      Left lower leg: No edema.      Comments: Right anterior knee swelling    Skin:     General: Skin is warm and dry.   Neurological:      Mental Status: He is alert.   Psychiatric:         Behavior: Behavior normal. Behavior is cooperative.          Relevant Results  Outside Hospital Results    Labs  Results from last 72 hours   Lab Units 10/10/24  0437 10/09/24  0623 10/08/24  0530   WBC AUTO x10*3/uL 5.4 9.0 10.9   HEMOGLOBIN g/dL 9.2* 9.6* 9.2*   HEMATOCRIT % 27.9* 28.4* 27.8*   PLATELETS AUTO x10*3/uL 55* 60* 60*   NEUTROS PCT AUTO % 55.7  --   --    LYMPHO PCT MAN %   "--  16.0 8.0   LYMPHS PCT AUTO % 22.3  --   --    MONO PCT MAN %  --  5.0 5.0   MONOS PCT AUTO % 16.8  --   --    EOSINO PCT MAN %  --  0.0 0.0   EOS PCT AUTO % 1.5  --   --      Results from last 72 hours   Lab Units 10/10/24  0437 10/09/24  0623 10/08/24  0530   SODIUM mmol/L 133* 135* 135*   POTASSIUM mmol/L 3.7 3.6 3.8   CHLORIDE mmol/L 105 106 106   CO2 mmol/L 24 22 24   BUN mg/dL 10 9 13   CREATININE mg/dL 0.76 0.73 0.73   GLUCOSE mg/dL 85 92 98   CALCIUM mg/dL 7.8* 8.1* 8.1*   ANION GAP mmol/L 8* 11 9*   EGFR mL/min/1.73m*2 >90 >90 >90   PHOSPHORUS mg/dL 3.7 3.7 2.4*     Results from last 72 hours   Lab Units 10/10/24  0437 10/09/24  0623 10/08/24  0530   ALK PHOS U/L 78 80 64   BILIRUBIN TOTAL mg/dL 1.0 1.1 1.2   PROTEIN TOTAL g/dL 6.1* 6.6 6.6   ALT U/L 23 22 14   AST U/L 57* 61* 37   ALBUMIN g/dL 2.4* 2.6* 2.6*     Estimated Creatinine Clearance: 125 mL/min (by C-G formula based on SCr of 0.76 mg/dL).  No results found for: \"CRP\", \"SEDRATE\"  No results found for: \"HIV1X2\", \"HIVCONF\", \"LIPXGI8FK\"  No results found for: \"HEPCABINIT\", \"HEPCAB\", \"HCVPCRQUANT\"  Microbiology  Reviewed   Imaging  Electrocardiogram, 12-lead PRN ACS symptoms    Result Date: 10/9/2024  Sinus rhythm with Premature atrial complexes Otherwise normal ECG No previous ECGs available    US gallbladder    Result Date: 10/7/2024  Interpreted By:  Marques Peres, STUDY: US GALLBLADDER  10/7/2024 7:18 pm   INDICATION: 50 y/o   M with  Signs/Symptoms:Concerns for cholelithatisis on ct.     COMPARISON: None.   ACCESSION NUMBER(S): [id number]   ORDERING CLINICIAN: SHARONA UGARTE   TECHNIQUE: Routine ultrasound of the right upper quadrant was performed.  Static images were obtained for remote interpretation.   FINDINGS: LIVER: Craniocaudal length:  20 cm,  within normal limits of size for age. Echogenicity:  Mildly increased Mass:  None.   BILE DUCTS: Intrahepatic ducts: Non-dilated. Common bile duct diameter: 3 mm.   GALLBLADDER: Gallbladder:  " Normal. Gallstones:  There are multiple stones and sludge noted within the gallbladder Gallbladder sludge:  See above. Gallbladder wall thickening:  There is gallbladder wall thickening noted.. Negative sonographic Rivera's sign. Pericholecystic fluid: None.   PANCREAS:   Visualized portions are unremarkable.   RIGHT KIDNEY: Craniocaudal length:  12.1 cm,  within normal limits of size for age. No hydronephrosis, hydroureter or focal renal lesion.   PERITONEAL FLUID:   There is trace ascites noted in the right upper quadrant.       Multiple stones noted within a collapsed gallbladder with wall thickening.   Fatty infiltration of the enlarged liver.   Trace free fluid in the right upper quadrant.   MACRO: None   Signed by: Marques Peres 10/7/2024 9:53 PM Dictation workstation:   [id number]    CT angio chest for pulmonary embolism    Result Date: 10/7/2024  Interpreted By:  Franco Keene, STUDY: CT ABDOMEN PELVIS WO IV CONTRAST; CT ANGIO CHEST FOR PULMONARY EMBOLISM;  10/7/2024 1:21 am; 10/7/2024 1:22 am   INDICATION: Signs/Symptoms:Hernia abdominal pain; Signs/Symptoms:SOB.   COMPARISON: None.   ACCESSION NUMBER(S): [id number]; [id number]   ORDERING CLINICIAN: MARIA ALEJANDRA MENDEZ   TECHNIQUE: Contiguous axial images of the abdomen and pelvis were obtained without intravenous contrast. Contiguous axial images of the chest were obtained after the intravenous administration of contrast. Coronal and sagittal reformatted images were obtained from the axial images. MIPS of the chest were also performed and reviewed.   FINDINGS: CT CHEST:   No axillary, mediastinal or hilar lymphadenopathy.   The heart is normal in size. No significant pericardial effusion. No evidence of acute central, main, lobar or proximal segmental pulmonary embolism.   There is consolidative opacity in the right upper lobe compatible with pneumonia. There is pulmonary emphysema. No significant pleural effusion. No pneumothorax.   Mild degenerative  change of the thoracic spine.   CT ABDOMEN AND PELVIS:   Evaluation of the abdominal viscera is limited secondary to lack of intravenous contrast.   There is hepatic steatosis and nodularity of liver contour compatible with cirrhosis. Limited evaluation for liver mass on noncontrast examination. There is cholelithiasis and pericholecystic edema.   There is mild peripancreatic edema.   No splenomegaly.   The adrenal glands appear unremarkable.   No evidence of renal calculus or hydronephrosis. Evaluation of the kidneys is otherwise limited secondary lack of intravenous contrast.   There is mild abdominal and pelvic ascites.   There is 5.4 cm x 3.3 cm umbilical hernia with herniation of ascites.   The stomach is underdistended and not well evaluated. No evidence of bowel obstruction. There is wall thickening of segments of colon. There is colonic diverticulosis.   Urinary bladder is underdistended and not well evaluated.   There are pars defects and grade 1 anterolisthesis of L5 on S1.       No evidence of acute pulmonary embolism.   Consolidative opacity in the right upper lobe compatible with pneumonia.   Hepatic steatosis and liver cirrhosis with mild abdominal and pelvic ascites. Cholelithiasis and gallbladder wall thickness/pericholecystic edema may relate to the underlying liver disease, however clinical correlation recommended if there is concern for acute cholecystitis in the setting for right upper quadrant pain and ultrasound may be obtained for further evaluation.   Colonic wall thickening which may be secondary to hypoproteinemia, or active to ascites or infectious/inflammatory in etiology.   Colonic diverticulosis.   MACRO: None   Signed by: Franco Keene 10/7/2024 2:38 AM Dictation workstation:   [id number]    CT abdomen pelvis wo IV contrast    Result Date: 10/7/2024  Interpreted By:  Franco Keene, STUDY: CT ABDOMEN PELVIS WO IV CONTRAST; CT ANGIO CHEST FOR PULMONARY EMBOLISM;  10/7/2024 1:21 am;  10/7/2024 1:22 am   INDICATION: Signs/Symptoms:Hernia abdominal pain; Signs/Symptoms:SOB.   COMPARISON: None.   ACCESSION NUMBER(S): [id number]; [id number]   ORDERING CLINICIAN: MARIA ALEJANDRA MENDEZ   TECHNIQUE: Contiguous axial images of the abdomen and pelvis were obtained without intravenous contrast. Contiguous axial images of the chest were obtained after the intravenous administration of contrast. Coronal and sagittal reformatted images were obtained from the axial images. MIPS of the chest were also performed and reviewed.   FINDINGS: CT CHEST:   No axillary, mediastinal or hilar lymphadenopathy.   The heart is normal in size. No significant pericardial effusion. No evidence of acute central, main, lobar or proximal segmental pulmonary embolism.   There is consolidative opacity in the right upper lobe compatible with pneumonia. There is pulmonary emphysema. No significant pleural effusion. No pneumothorax.   Mild degenerative change of the thoracic spine.   CT ABDOMEN AND PELVIS:   Evaluation of the abdominal viscera is limited secondary to lack of intravenous contrast.   There is hepatic steatosis and nodularity of liver contour compatible with cirrhosis. Limited evaluation for liver mass on noncontrast examination. There is cholelithiasis and pericholecystic edema.   There is mild peripancreatic edema.   No splenomegaly.   The adrenal glands appear unremarkable.   No evidence of renal calculus or hydronephrosis. Evaluation of the kidneys is otherwise limited secondary lack of intravenous contrast.   There is mild abdominal and pelvic ascites.   There is 5.4 cm x 3.3 cm umbilical hernia with herniation of ascites.   The stomach is underdistended and not well evaluated. No evidence of bowel obstruction. There is wall thickening of segments of colon. There is colonic diverticulosis.   Urinary bladder is underdistended and not well evaluated.   There are pars defects and grade 1 anterolisthesis of L5 on S1.        No evidence of acute pulmonary embolism.   Consolidative opacity in the right upper lobe compatible with pneumonia.   Hepatic steatosis and liver cirrhosis with mild abdominal and pelvic ascites. Cholelithiasis and gallbladder wall thickness/pericholecystic edema may relate to the underlying liver disease, however clinical correlation recommended if there is concern for acute cholecystitis in the setting for right upper quadrant pain and ultrasound may be obtained for further evaluation.   Colonic wall thickening which may be secondary to hypoproteinemia, or active to ascites or infectious/inflammatory in etiology.   Colonic diverticulosis.   MACRO: None   Signed by: Franco Keene 10/7/2024 2:38 AM Dictation workstation:   [id number]    XR chest 2 views    Result Date: 10/6/2024  Interpreted By:  Tod Johnson, STUDY: XR CHEST 2 VIEWS;  10/6/2024 8:44 pm   INDICATION: Signs/Symptoms:r/o PNA.     COMPARISON: None.   ACCESSION NUMBER(S): [id number]   ORDERING CLINICIAN: JAIRO GLEZ   FINDINGS:         CARDIOMEDIASTINAL SILHOUETTE: Cardiomediastinal silhouette is normal in size and configuration.   LUNGS: Dense airspace disease in the right upper lobe. The left lung is clear. There is no effusion. There is no edema   ABDOMEN: No remarkable upper abdominal findings.   BONES: No acute osseous changes.       1. Right upper lobe pneumonia. Radiographic follow-up to resolution in 3-4 weeks advised       MACRO: None   Signed by: Tod Johnson 10/6/2024 9:00 PM Dictation workstation:   [id number]     Assessment/Plan   Community-acquired pneumonia  Fever secondary to pneumonia  Right knee swelling, suspect bursitis    Continue ceftriaxone  Continue azithromycin  Orthopedic surgery consult  Follow-up blood cultures  Legionella and streptococcal antigen  Supportive care  Monitor temperature and WBC      Fritz Romero MD

## 2024-10-11 ENCOUNTER — APPOINTMENT (OUTPATIENT)
Dept: RADIOLOGY | Facility: HOSPITAL | Age: 49
End: 2024-10-11
Payer: COMMERCIAL

## 2024-10-11 LAB
ALBUMIN SERPL BCP-MCNC: 2.6 G/DL (ref 3.4–5)
ALP SERPL-CCNC: 82 U/L (ref 33–120)
ALT SERPL W P-5'-P-CCNC: 26 U/L (ref 10–52)
ANION GAP SERPL CALCULATED.3IONS-SCNC: 9 MMOL/L (ref 10–20)
AST SERPL W P-5'-P-CCNC: 60 U/L (ref 9–39)
BACTERIA BLD CULT: NORMAL
BACTERIA BLD CULT: NORMAL
BASOPHILS # BLD MANUAL: 0 X10*3/UL (ref 0–0.1)
BASOPHILS NFR BLD MANUAL: 0 %
BILIRUB SERPL-MCNC: 1.1 MG/DL (ref 0–1.2)
BUN SERPL-MCNC: 9 MG/DL (ref 6–23)
CALCIUM SERPL-MCNC: 8.2 MG/DL (ref 8.6–10.3)
CHLORIDE SERPL-SCNC: 107 MMOL/L (ref 98–107)
CO2 SERPL-SCNC: 23 MMOL/L (ref 21–32)
CREAT SERPL-MCNC: 0.75 MG/DL (ref 0.5–1.3)
DOHLE BOD BLD QL SMEAR: PRESENT
EGFRCR SERPLBLD CKD-EPI 2021: >90 ML/MIN/1.73M*2
EOSINOPHIL # BLD MANUAL: 0 X10*3/UL (ref 0–0.7)
EOSINOPHIL NFR BLD MANUAL: 0 %
ERYTHROCYTE [DISTWIDTH] IN BLOOD BY AUTOMATED COUNT: 17.4 % (ref 11.5–14.5)
FLUAV RNA RESP QL NAA+PROBE: NOT DETECTED
FLUBV RNA RESP QL NAA+PROBE: NOT DETECTED
GLUCOSE SERPL-MCNC: 131 MG/DL (ref 74–99)
HCT VFR BLD AUTO: 30.5 % (ref 41–52)
HGB BLD-MCNC: 10 G/DL (ref 13.5–17.5)
IMM GRANULOCYTES # BLD AUTO: 0.28 X10*3/UL (ref 0–0.7)
IMM GRANULOCYTES NFR BLD AUTO: 5.2 % (ref 0–0.9)
LEGIONELLA AG UR QL: NEGATIVE
LYMPHOCYTES # BLD MANUAL: 1.08 X10*3/UL (ref 1.2–4.8)
LYMPHOCYTES NFR BLD MANUAL: 20 %
MAGNESIUM SERPL-MCNC: 1.58 MG/DL (ref 1.6–2.4)
MCH RBC QN AUTO: 28.8 PG (ref 26–34)
MCHC RBC AUTO-ENTMCNC: 32.8 G/DL (ref 32–36)
MCV RBC AUTO: 88 FL (ref 80–100)
METAMYELOCYTES # BLD MANUAL: 0.11 X10*3/UL
METAMYELOCYTES NFR BLD MANUAL: 2 %
MONOCYTES # BLD MANUAL: 0.59 X10*3/UL (ref 0.1–1)
MONOCYTES NFR BLD MANUAL: 11 %
MYELOCYTES # BLD MANUAL: 0.05 X10*3/UL
MYELOCYTES NFR BLD MANUAL: 1 %
NEUTROPHILS # BLD MANUAL: 3.45 X10*3/UL (ref 1.2–7.7)
NEUTS BAND # BLD MANUAL: 0.05 X10*3/UL (ref 0–0.7)
NEUTS BAND NFR BLD MANUAL: 1 %
NEUTS SEG # BLD MANUAL: 3.4 X10*3/UL (ref 1.2–7)
NEUTS SEG NFR BLD MANUAL: 63 %
NRBC BLD MANUAL-RTO: 1 % (ref 0–0)
NRBC BLD-RTO: 1.3 /100 WBCS (ref 0–0)
OVALOCYTES BLD QL SMEAR: ABNORMAL
PHOSPHATE SERPL-MCNC: 3.5 MG/DL (ref 2.5–4.9)
PLATELET # BLD AUTO: 61 X10*3/UL (ref 150–450)
POLYCHROMASIA BLD QL SMEAR: ABNORMAL
POTASSIUM SERPL-SCNC: 3.8 MMOL/L (ref 3.5–5.3)
PROT SERPL-MCNC: 6.6 G/DL (ref 6.4–8.2)
RBC # BLD AUTO: 3.47 X10*6/UL (ref 4.5–5.9)
RBC MORPH BLD: ABNORMAL
RSV RNA RESP QL NAA+PROBE: NOT DETECTED
S PNEUM AG UR QL: NEGATIVE
SARS-COV-2 RNA RESP QL NAA+PROBE: NOT DETECTED
SODIUM SERPL-SCNC: 135 MMOL/L (ref 136–145)
TARGETS BLD QL SMEAR: ABNORMAL
TOTAL CELLS COUNTED BLD: 100
VARIANT LYMPHS # BLD MANUAL: 0.11 X10*3/UL (ref 0–0.5)
VARIANT LYMPHS NFR BLD: 2 %
WBC # BLD AUTO: 5.4 X10*3/UL (ref 4.4–11.3)

## 2024-10-11 PROCEDURE — 83735 ASSAY OF MAGNESIUM: CPT

## 2024-10-11 PROCEDURE — 87637 SARSCOV2&INF A&B&RSV AMP PRB: CPT | Performed by: NURSE PRACTITIONER

## 2024-10-11 PROCEDURE — 2500000001 HC RX 250 WO HCPCS SELF ADMINISTERED DRUGS (ALT 637 FOR MEDICARE OP)

## 2024-10-11 PROCEDURE — 2060000001 HC INTERMEDIATE ICU ROOM DAILY

## 2024-10-11 PROCEDURE — 80053 COMPREHEN METABOLIC PANEL: CPT

## 2024-10-11 PROCEDURE — 36415 COLL VENOUS BLD VENIPUNCTURE: CPT | Performed by: NURSE PRACTITIONER

## 2024-10-11 PROCEDURE — 73562 X-RAY EXAM OF KNEE 3: CPT | Mod: RT

## 2024-10-11 PROCEDURE — 84100 ASSAY OF PHOSPHORUS: CPT

## 2024-10-11 PROCEDURE — 36415 COLL VENOUS BLD VENIPUNCTURE: CPT

## 2024-10-11 PROCEDURE — 73562 X-RAY EXAM OF KNEE 3: CPT | Mod: RIGHT SIDE | Performed by: RADIOLOGY

## 2024-10-11 PROCEDURE — 85027 COMPLETE CBC AUTOMATED: CPT

## 2024-10-11 PROCEDURE — 87205 SMEAR GRAM STAIN: CPT | Mod: WESLAB | Performed by: NURSE PRACTITIONER

## 2024-10-11 PROCEDURE — 87040 BLOOD CULTURE FOR BACTERIA: CPT | Mod: WESLAB | Performed by: NURSE PRACTITIONER

## 2024-10-11 PROCEDURE — 85007 BL SMEAR W/DIFF WBC COUNT: CPT

## 2024-10-11 PROCEDURE — 2500000004 HC RX 250 GENERAL PHARMACY W/ HCPCS (ALT 636 FOR OP/ED)

## 2024-10-11 RX ORDER — IBUPROFEN 600 MG/1
600 TABLET ORAL EVERY 6 HOURS PRN
Status: DISCONTINUED | OUTPATIENT
Start: 2024-10-11 | End: 2024-10-12 | Stop reason: HOSPADM

## 2024-10-11 RX ORDER — AMOXICILLIN AND CLAVULANATE POTASSIUM 875; 125 MG/1; MG/1
1 TABLET, FILM COATED ORAL 2 TIMES DAILY
Qty: 10 TABLET | Refills: 0 | Status: CANCELLED | OUTPATIENT
Start: 2024-10-11 | End: 2024-10-16

## 2024-10-11 ASSESSMENT — COGNITIVE AND FUNCTIONAL STATUS - GENERAL
DAILY ACTIVITIY SCORE: 24
MOBILITY SCORE: 24

## 2024-10-11 ASSESSMENT — PAIN SCALES - GENERAL
PAINLEVEL_OUTOF10: 0 - NO PAIN
PAINLEVEL_OUTOF10: 3

## 2024-10-11 ASSESSMENT — PAIN - FUNCTIONAL ASSESSMENT
PAIN_FUNCTIONAL_ASSESSMENT: 0-10
PAIN_FUNCTIONAL_ASSESSMENT: 0-10

## 2024-10-11 ASSESSMENT — PAIN DESCRIPTION - DESCRIPTORS: DESCRIPTORS: ACHING;DISCOMFORT;SORE

## 2024-10-11 NOTE — PROGRESS NOTES
10/11/24 0819   Discharge Planning   Expected Discharge Disposition Home     No skilled needs

## 2024-10-11 NOTE — CARE PLAN
he clinical goals for the shift include obtain ortho consult      Problem: Pain - Adult  Goal: Verbalizes/displays adequate comfort level or baseline comfort level  Outcome: Progressing

## 2024-10-11 NOTE — PROGRESS NOTES
Tyler Hernandez is a 49 y.o. male on day 5 of admission presenting with Sepsis (Multi).      Subjective   Tyler Hernandez is a 49 y.o. year old male patient with no known Past Medical History. He presented to the ER due to flu like symptoms that started this past Tuesday. He says the symptoms have been on and off since then. He has chills, fevers, cough and fatigue. He also states his grandchildren were recently over and had the same type of symptoms. 3L NS given. Lactate is uptrending from 5.3 -> 8.     # Patient seen at bedside.   # Events from the last visit reviewed.   # Discussed with staff.   # Results of tests and investigations from last visit reviewed and discussed with patient/Family.  #  Electronic chart reviewed.   # Input / Recommendations  from other care providers appreciated and reviewed.  # Some parts of this chart are copied from previous encounters, pertinent changes from today are updated.  # Parts of this chart have been completed using voice recognition software. Please excuse any errors of transcription.      Objective     Last Recorded Vitals  /65 (BP Location: Left arm, Patient Position: Lying)   Pulse 69   Temp 37 °C (98.6 °F) (Temporal)   Resp 18   Wt 95.9 kg (211 lb 6.7 oz)   SpO2 96%   Intake/Output last 3 Shifts:    Intake/Output Summary (Last 24 hours) at 10/11/2024 1001  Last data filed at 10/11/2024 0620  Gross per 24 hour   Intake 1065 ml   Output 1 ml   Net 1064 ml       Admission Weight  Weight: 90.3 kg (199 lb) (10/06/24 1942)    Daily Weight  10/11/24 : 95.9 kg (211 lb 6.7 oz)    Image Results  Electrocardiogram, 12-lead PRN ACS symptoms  Sinus rhythm with Premature atrial complexes  Otherwise normal ECG  No previous ECGs available      Physical Exam    Constitutional:       Appearance: Normal appearance.   Eyes:      Pupils: Pupils are equal, round, and reactive to light.   Cardiovascular:      Rate and Rhythm: Normal rate and regular rhythm.   Pulmonary:       Breath sounds: Rhonchi present.   Abdominal:      General: Abdomen is flat.      Palpations: Abdomen is soft.   Musculoskeletal:         General: Normal range of motion.      Cervical back: Normal range of motion.   Skin:     General: Skin is warm and dry.   Neurological:      Mental Status: He is alert and oriented to person, place, and time.     Relevant Results  Results for orders placed or performed during the hospital encounter of 10/06/24 (from the past 24 hour(s))   CBC   Result Value Ref Range    WBC 5.6 4.4 - 11.3 x10*3/uL    nRBC 2.0 (H) 0.0 - 0.0 /100 WBCs    RBC 3.46 (L) 4.50 - 5.90 x10*6/uL    Hemoglobin 10.0 (L) 13.5 - 17.5 g/dL    Hematocrit 30.1 (L) 41.0 - 52.0 %    MCV 87 80 - 100 fL    MCH 28.9 26.0 - 34.0 pg    MCHC 33.2 32.0 - 36.0 g/dL    RDW 17.2 (H) 11.5 - 14.5 %    Platelets 55 (L) 150 - 450 x10*3/uL   Comprehensive Metabolic Panel   Result Value Ref Range    Glucose 152 (H) 74 - 99 mg/dL    Sodium 131 (L) 136 - 145 mmol/L    Potassium 3.9 3.5 - 5.3 mmol/L    Chloride 103 98 - 107 mmol/L    Bicarbonate 21 21 - 32 mmol/L    Anion Gap 11 10 - 20 mmol/L    Urea Nitrogen 9 6 - 23 mg/dL    Creatinine 0.77 0.50 - 1.30 mg/dL    eGFR >90 >60 mL/min/1.73m*2    Calcium 8.2 (L) 8.6 - 10.3 mg/dL    Albumin 2.8 (L) 3.4 - 5.0 g/dL    Alkaline Phosphatase 86 33 - 120 U/L    Total Protein 7.0 6.4 - 8.2 g/dL    AST 63 (H) 9 - 39 U/L    Bilirubin, Total 1.3 (H) 0.0 - 1.2 mg/dL    ALT 26 10 - 52 U/L   CBC and Auto Differential   Result Value Ref Range    WBC 5.4 4.4 - 11.3 x10*3/uL    nRBC 1.3 (H) 0.0 - 0.0 /100 WBCs    RBC 3.47 (L) 4.50 - 5.90 x10*6/uL    Hemoglobin 10.0 (L) 13.5 - 17.5 g/dL    Hematocrit 30.5 (L) 41.0 - 52.0 %    MCV 88 80 - 100 fL    MCH 28.8 26.0 - 34.0 pg    MCHC 32.8 32.0 - 36.0 g/dL    RDW 17.4 (H) 11.5 - 14.5 %    Platelets 61 (L) 150 - 450 x10*3/uL    Immature Granulocytes %, Automated 5.2 (H) 0.0 - 0.9 %    Immature Granulocytes Absolute, Automated 0.28 0.00 - 0.70 x10*3/uL    Comprehensive Metabolic Panel   Result Value Ref Range    Glucose 131 (H) 74 - 99 mg/dL    Sodium 135 (L) 136 - 145 mmol/L    Potassium 3.8 3.5 - 5.3 mmol/L    Chloride 107 98 - 107 mmol/L    Bicarbonate 23 21 - 32 mmol/L    Anion Gap 9 (L) 10 - 20 mmol/L    Urea Nitrogen 9 6 - 23 mg/dL    Creatinine 0.75 0.50 - 1.30 mg/dL    eGFR >90 >60 mL/min/1.73m*2    Calcium 8.2 (L) 8.6 - 10.3 mg/dL    Albumin 2.6 (L) 3.4 - 5.0 g/dL    Alkaline Phosphatase 82 33 - 120 U/L    Total Protein 6.6 6.4 - 8.2 g/dL    AST 60 (H) 9 - 39 U/L    Bilirubin, Total 1.1 0.0 - 1.2 mg/dL    ALT 26 10 - 52 U/L   Magnesium   Result Value Ref Range    Magnesium 1.58 (L) 1.60 - 2.40 mg/dL   Phosphorus   Result Value Ref Range    Phosphorus 3.5 2.5 - 4.9 mg/dL   Manual Differential   Result Value Ref Range    Neutrophils %, Manual 63.0 40.0 - 80.0 %    Bands %, Manual 1.0 0.0 - 5.0 %    Lymphocytes %, Manual 20.0 13.0 - 44.0 %    Monocytes %, Manual 11.0 2.0 - 10.0 %    Eosinophils %, Manual 0.0 0.0 - 6.0 %    Basophils %, Manual 0.0 0.0 - 2.0 %    Atypical Lymphocytes %, Manual 2.0 0.0 - 2.0 %    Metamyelocytes %, Manual 2.0 0.0 - 0.0 %    Myelocytes %, Manual 1.0 0.0 - 0.0 %    Seg Neutrophils Absolute, Manual 3.40 1.20 - 7.00 x10*3/uL    Bands Absolute, Manual 0.05 0.00 - 0.70 x10*3/uL    Lymphocytes Absolute, Manual 1.08 (L) 1.20 - 4.80 x10*3/uL    Monocytes Absolute, Manual 0.59 0.10 - 1.00 x10*3/uL    Eosinophils Absolute, Manual 0.00 0.00 - 0.70 x10*3/uL    Basophils Absolute, Manual 0.00 0.00 - 0.10 x10*3/uL    Atypical Lymphs Absolute, Manual 0.11 0.00 - 0.50 x10*3/uL    Metamyelocytes Absolute, Manual 0.11 0.00 - 0.00 x10*3/uL    Myelocytes Absolute, Manual 0.05 0.00 - 0.00 x10*3/uL    Total Cells Counted 100     Neutrophils Absolute, Manual 3.45 1.20 - 7.70 x10*3/uL    Manual nRBC per 100 Cells 1.0 (H) 0.0 - 0.0 %    RBC Morphology See Below     Polychromasia Mild     Target Cells Few     Ovalocytes Few     Dohle Bodies Present           Assessment/Plan        # Sepsis d/t pneumonia - improving slowly with atbs  - CT : Consolidative opacity in the right upper lobe compatible with  pneumonia.  # ETOH abuse-Encourage ETOH cessation, CIWA.   # Electrolyte disturbance-D/t the above, monitor lab work.   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  Okay for discharge home today    # Patient seen at bedside.   # Events from the last visit reviewed.   # Discussed with staff.   # Results of tests and investigations from last visit reviewed and discussed with patient/Family.  #  Electronic chart reviewed.   # Input / Recommendations  from other care providers appreciated and reviewed.  # Some parts of this chart are copied from previous encounters, pertinent changes from today are updated.  # Parts of this chart have been completed using voice recognition software. Please excuse any errors of transcription.      Christian Marcos MD

## 2024-10-11 NOTE — PROGRESS NOTES
Tyler Hernandez is a 49 y.o. male on day 5 of admission presenting with Sepsis (Multi).    Subjective   Temp max  38.8C overnight  Chills with fever.  Reports shortness of breath when coughing.  Reports a productive cough with grey to orange sputum  Denies nausea, vomiting, diarrhea  No abdominal pain    Objective   Range of Vitals (last 24 hours)  Heart Rate:  [69-87]   Temp:  [37 °C (98.6 °F)-38.8 °C (101.8 °F)]   Resp:  [17-19]   BP: (101-126)/(54-80)   Weight:  [95.9 kg (211 lb 6.7 oz)]   SpO2:  [93 %-96 %]   Daily Weight  10/11/24 : 95.9 kg (211 lb 6.7 oz)    Body mass index is 27.13 kg/m².    Physical Exam  Constitutional:       Appearance: Normal appearance.   HENT:      Head: Normocephalic and atraumatic.   Eyes:      Extraocular Movements: Extraocular movements intact.      Conjunctiva/sclera: Conjunctivae normal.   Cardiovascular:      Rate and Rhythm: Normal rate and regular rhythm.   Pulmonary:      Effort: Pulmonary effort is normal.      Comments: Diminished bilaterally  Abdominal:      General: Bowel sounds are normal.      Palpations: Abdomen is soft.      Tenderness: There is no abdominal tenderness.   Musculoskeletal:      Cervical back: Normal range of motion.      Comments: Right anterior knee swelling   Skin:     General: Skin is warm and dry.   Neurological:      General: No focal deficit present.      Mental Status: He is alert and oriented to person, place, and time.   Psychiatric:         Mood and Affect: Mood normal.         Behavior: Behavior normal.           Antibiotics  azithromycin - 500 mg  cefTRIAXone - 1 gram/50 mL    Relevant Results  Labs  Results from last 72 hours   Lab Units 10/11/24  0433 10/10/24  1742 10/10/24  0437 10/09/24  0623   WBC AUTO x10*3/uL 5.4 5.6 5.4 9.0   HEMOGLOBIN g/dL 10.0* 10.0* 9.2* 9.6*   HEMATOCRIT % 30.5* 30.1* 27.9* 28.4*   PLATELETS AUTO x10*3/uL 61* 55* 55* 60*   NEUTROS PCT AUTO %  --   --  55.7  --    LYMPHO PCT MAN % 20.0  --   --  16.0   LYMPHS  "PCT AUTO %  --   --  22.3  --    MONO PCT MAN % 11.0  --   --  5.0   MONOS PCT AUTO %  --   --  16.8  --    EOSINO PCT MAN % 0.0  --   --  0.0   EOS PCT AUTO %  --   --  1.5  --      Results from last 72 hours   Lab Units 10/11/24  0433 10/10/24  1742 10/10/24  0437 10/09/24  0623   SODIUM mmol/L 135* 131* 133* 135*   POTASSIUM mmol/L 3.8 3.9 3.7 3.6   CHLORIDE mmol/L 107 103 105 106   CO2 mmol/L 23 21 24 22   BUN mg/dL 9 9 10 9   CREATININE mg/dL 0.75 0.77 0.76 0.73   GLUCOSE mg/dL 131* 152* 85 92   CALCIUM mg/dL 8.2* 8.2* 7.8* 8.1*   ANION GAP mmol/L 9* 11 8* 11   EGFR mL/min/1.73m*2 >90 >90 >90 >90   PHOSPHORUS mg/dL 3.5  --  3.7 3.7     Results from last 72 hours   Lab Units 10/11/24  0433 10/10/24  1742 10/10/24  0437   ALK PHOS U/L 82 86 78   BILIRUBIN TOTAL mg/dL 1.1 1.3* 1.0   PROTEIN TOTAL g/dL 6.6 7.0 6.1*   ALT U/L 26 26 23   AST U/L 60* 63* 57*   ALBUMIN g/dL 2.6* 2.8* 2.4*     Estimated Creatinine Clearance: 125 mL/min (by C-G formula based on SCr of 0.75 mg/dL).  No results found for: \"CRP\"  Microbiology  Blood cultures final no growth  Legionella antigen negative  Streptococcus antigen negative   Imaging  Electrocardiogram, 12-lead PRN ACS symptoms    Result Date: 10/9/2024  Sinus rhythm with Premature atrial complexes Otherwise normal ECG No previous ECGs available    US gallbladder    Result Date: 10/7/2024  Interpreted By:  Marques Peres, STUDY: US GALLBLADDER  10/7/2024 7:18 pm   INDICATION: 48 y/o   M with  Signs/Symptoms:Concerns for cholelithatisis on ct.     COMPARISON: None.   ACCESSION NUMBER(S): [id number]   ORDERING CLINICIAN: SHARONA KISS   TECHNIQUE: Routine ultrasound of the right upper quadrant was performed.  Static images were obtained for remote interpretation.   FINDINGS: LIVER: Craniocaudal length:  20 cm,  within normal limits of size for age. Echogenicity:  Mildly increased Mass:  None.   BILE DUCTS: Intrahepatic ducts: Non-dilated. Common bile duct diameter: 3 mm.   " GALLBLADDER: Gallbladder:  Normal. Gallstones:  There are multiple stones and sludge noted within the gallbladder Gallbladder sludge:  See above. Gallbladder wall thickening:  There is gallbladder wall thickening noted.. Negative sonographic Rivera's sign. Pericholecystic fluid: None.   PANCREAS:   Visualized portions are unremarkable.   RIGHT KIDNEY: Craniocaudal length:  12.1 cm,  within normal limits of size for age. No hydronephrosis, hydroureter or focal renal lesion.   PERITONEAL FLUID:   There is trace ascites noted in the right upper quadrant.       Multiple stones noted within a collapsed gallbladder with wall thickening.   Fatty infiltration of the enlarged liver.   Trace free fluid in the right upper quadrant.   MACRO: None   Signed by: Marques Peres 10/7/2024 9:53 PM Dictation workstation:   [id number]    CT angio chest for pulmonary embolism    Result Date: 10/7/2024  Interpreted By:  Franco Keene, STUDY: CT ABDOMEN PELVIS WO IV CONTRAST; CT ANGIO CHEST FOR PULMONARY EMBOLISM;  10/7/2024 1:21 am; 10/7/2024 1:22 am   INDICATION: Signs/Symptoms:Hernia abdominal pain; Signs/Symptoms:SOB.   COMPARISON: None.   ACCESSION NUMBER(S): [id number]; [id number]   ORDERING CLINICIAN: MARIA ALEJANDRA MENDEZ   TECHNIQUE: Contiguous axial images of the abdomen and pelvis were obtained without intravenous contrast. Contiguous axial images of the chest were obtained after the intravenous administration of contrast. Coronal and sagittal reformatted images were obtained from the axial images. MIPS of the chest were also performed and reviewed.   FINDINGS: CT CHEST:   No axillary, mediastinal or hilar lymphadenopathy.   The heart is normal in size. No significant pericardial effusion. No evidence of acute central, main, lobar or proximal segmental pulmonary embolism.   There is consolidative opacity in the right upper lobe compatible with pneumonia. There is pulmonary emphysema. No significant pleural effusion. No  pneumothorax.   Mild degenerative change of the thoracic spine.   CT ABDOMEN AND PELVIS:   Evaluation of the abdominal viscera is limited secondary to lack of intravenous contrast.   There is hepatic steatosis and nodularity of liver contour compatible with cirrhosis. Limited evaluation for liver mass on noncontrast examination. There is cholelithiasis and pericholecystic edema.   There is mild peripancreatic edema.   No splenomegaly.   The adrenal glands appear unremarkable.   No evidence of renal calculus or hydronephrosis. Evaluation of the kidneys is otherwise limited secondary lack of intravenous contrast.   There is mild abdominal and pelvic ascites.   There is 5.4 cm x 3.3 cm umbilical hernia with herniation of ascites.   The stomach is underdistended and not well evaluated. No evidence of bowel obstruction. There is wall thickening of segments of colon. There is colonic diverticulosis.   Urinary bladder is underdistended and not well evaluated.   There are pars defects and grade 1 anterolisthesis of L5 on S1.       No evidence of acute pulmonary embolism.   Consolidative opacity in the right upper lobe compatible with pneumonia.   Hepatic steatosis and liver cirrhosis with mild abdominal and pelvic ascites. Cholelithiasis and gallbladder wall thickness/pericholecystic edema may relate to the underlying liver disease, however clinical correlation recommended if there is concern for acute cholecystitis in the setting for right upper quadrant pain and ultrasound may be obtained for further evaluation.   Colonic wall thickening which may be secondary to hypoproteinemia, or active to ascites or infectious/inflammatory in etiology.   Colonic diverticulosis.   MACRO: None   Signed by: Franco Keene 10/7/2024 2:38 AM Dictation workstation:   [id number]    CT abdomen pelvis wo IV contrast    Result Date: 10/7/2024  Interpreted By:  Franco Keene, STUDY: CT ABDOMEN PELVIS WO IV CONTRAST; CT ANGIO CHEST FOR PULMONARY  EMBOLISM;  10/7/2024 1:21 am; 10/7/2024 1:22 am   INDICATION: Signs/Symptoms:Hernia abdominal pain; Signs/Symptoms:SOB.   COMPARISON: None.   ACCESSION NUMBER(S): [id number]; [id number]   ORDERING CLINICIAN: MARIA ALEJANDRA MENDEZ   TECHNIQUE: Contiguous axial images of the abdomen and pelvis were obtained without intravenous contrast. Contiguous axial images of the chest were obtained after the intravenous administration of contrast. Coronal and sagittal reformatted images were obtained from the axial images. MIPS of the chest were also performed and reviewed.   FINDINGS: CT CHEST:   No axillary, mediastinal or hilar lymphadenopathy.   The heart is normal in size. No significant pericardial effusion. No evidence of acute central, main, lobar or proximal segmental pulmonary embolism.   There is consolidative opacity in the right upper lobe compatible with pneumonia. There is pulmonary emphysema. No significant pleural effusion. No pneumothorax.   Mild degenerative change of the thoracic spine.   CT ABDOMEN AND PELVIS:   Evaluation of the abdominal viscera is limited secondary to lack of intravenous contrast.   There is hepatic steatosis and nodularity of liver contour compatible with cirrhosis. Limited evaluation for liver mass on noncontrast examination. There is cholelithiasis and pericholecystic edema.   There is mild peripancreatic edema.   No splenomegaly.   The adrenal glands appear unremarkable.   No evidence of renal calculus or hydronephrosis. Evaluation of the kidneys is otherwise limited secondary lack of intravenous contrast.   There is mild abdominal and pelvic ascites.   There is 5.4 cm x 3.3 cm umbilical hernia with herniation of ascites.   The stomach is underdistended and not well evaluated. No evidence of bowel obstruction. There is wall thickening of segments of colon. There is colonic diverticulosis.   Urinary bladder is underdistended and not well evaluated.   There are pars defects and grade 1  anterolisthesis of L5 on S1.       No evidence of acute pulmonary embolism.   Consolidative opacity in the right upper lobe compatible with pneumonia.   Hepatic steatosis and liver cirrhosis with mild abdominal and pelvic ascites. Cholelithiasis and gallbladder wall thickness/pericholecystic edema may relate to the underlying liver disease, however clinical correlation recommended if there is concern for acute cholecystitis in the setting for right upper quadrant pain and ultrasound may be obtained for further evaluation.   Colonic wall thickening which may be secondary to hypoproteinemia, or active to ascites or infectious/inflammatory in etiology.   Colonic diverticulosis.   MACRO: None   Signed by: Franco Keene 10/7/2024 2:38 AM Dictation workstation:   [id number]    XR chest 2 views    Result Date: 10/6/2024  Interpreted By:  Tod Johnson, STUDY: XR CHEST 2 VIEWS;  10/6/2024 8:44 pm   INDICATION: Signs/Symptoms:r/o PNA.     COMPARISON: None.   ACCESSION NUMBER(S): [id number]   ORDERING CLINICIAN: JAIRO GLEZ   FINDINGS:         CARDIOMEDIASTINAL SILHOUETTE: Cardiomediastinal silhouette is normal in size and configuration.   LUNGS: Dense airspace disease in the right upper lobe. The left lung is clear. There is no effusion. There is no edema   ABDOMEN: No remarkable upper abdominal findings.   BONES: No acute osseous changes.       1. Right upper lobe pneumonia. Radiographic follow-up to resolution in 3-4 weeks advised       MACRO: None   Signed by: Tod Johnson 10/6/2024 9:00 PM Dictation workstation:   [id number]     Assessment/Plan   Community-acquired pneumonia  Fever secondary to pneumonia  Right knee swelling-ortho  note reviewed, suspected patellar tendinitis     Continue ceftriaxone  Continue azithromycin  Repeat blood cultures for fever  Covid/Flu/RSV PCR  Sputum culture  Supportive care  Monitor temperature and WBC    Discussed with Dr. Romero    Total time spent caring for the patient  today was 20 minutes.  This includes time spent before the visit reviewing the chart, time spent during the visit, and time spent after the visit on documentation.        Griselda SARKAR Staff, APRN-CNP

## 2024-10-11 NOTE — CARE PLAN
Problem: Pain - Adult  Goal: Verbalizes/displays adequate comfort level or baseline comfort level  Outcome: Progressing     Problem: Safety - Adult  Goal: Free from fall injury  Outcome: Progressing   The patient's goals for the shift include  rest    The clinical goals for the shift include Safety    Over the shift, the patient did not make progress toward the following goals.

## 2024-10-11 NOTE — CONSULTS
"Reason For Consult  Right knee pain    History Of Present Illness  Tyler Hernandez is a 49 y.o. male presenting with right-sided knee pain.  Patient is admitted to the hospital for pneumonia and since admission over the past couple days he is noticing pain along the anterior aspect of the right knee.  No history of recent trauma or injury to the knee but notes he sprained his knees when he was in high school at some point denies any recent falls or traumas denies numbness or tingling he has fevers and chills that he attributes to his pneumonia no treatments for the knee otherwise.     Past Medical History  He has no past medical history on file.    Surgical History  He has no past surgical history on file.     Social History  He reports that he has been smoking cigarettes. He has never used smokeless tobacco. He reports current drug use. Drug: Marijuana. No history on file for alcohol use.    Family History  No family history on file.     Allergies  Tetracyclines    Review of Systems  Notes some chest discomfort in the right knee pain     Physical Exam  Patient alert no signs of distress  As of the right leg no gross deformity some mild swelling over the tibial tubercle and some tenderness to palpation there no obvious fluid accumulation or fluctuance no tenderness palpation over the anterior aspect of the patella or over the knee joint knee range of motion 0 to 125 degrees is stable to varus valgus stressing negative anterior posterior drawer no pain resisted knee extension or flexion.  No calf pain or tenderness positive EHL FHL and sensation intact light touch throughout     Last Recorded Vitals  Blood pressure 113/60, pulse 80, temperature 37 °C (98.6 °F), temperature source Temporal, resp. rate 19, height 1.88 m (6' 2.02\"), weight 95.9 kg (211 lb 6.7 oz), SpO2 93%.    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  It appears to be more like a patellar tendinitis.  I do not feel the patient has any significant bursitis or anything that would need to be surgically drained.  Would recommend just therapy and anti-inflammatories as needed for this.  May follow-up in the office in 3 to 4 weeks if has any significant symptoms otherwise just as needed    I spent 30 minutes in the professional and overall care of this patient.      Quincy Mantilla MD

## 2024-10-12 VITALS
WEIGHT: 210.54 LBS | BODY MASS INDEX: 27.02 KG/M2 | OXYGEN SATURATION: 97 % | HEART RATE: 81 BPM | DIASTOLIC BLOOD PRESSURE: 73 MMHG | SYSTOLIC BLOOD PRESSURE: 116 MMHG | HEIGHT: 74 IN | TEMPERATURE: 98.1 F | RESPIRATION RATE: 18 BRPM

## 2024-10-12 LAB
ALBUMIN SERPL BCP-MCNC: 2.5 G/DL (ref 3.4–5)
ALP SERPL-CCNC: 84 U/L (ref 33–120)
ALT SERPL W P-5'-P-CCNC: 33 U/L (ref 10–52)
ANION GAP SERPL CALCULATED.3IONS-SCNC: 9 MMOL/L (ref 10–20)
AST SERPL W P-5'-P-CCNC: 74 U/L (ref 9–39)
ATRIAL RATE: 82 BPM
BACTERIA SPEC RESP CULT: ABNORMAL
BASOPHILS # BLD AUTO: 0.05 X10*3/UL (ref 0–0.1)
BASOPHILS NFR BLD AUTO: 0.9 %
BILIRUB SERPL-MCNC: 1.1 MG/DL (ref 0–1.2)
BUN SERPL-MCNC: 9 MG/DL (ref 6–23)
CALCIUM SERPL-MCNC: 8.3 MG/DL (ref 8.6–10.3)
CHLORIDE SERPL-SCNC: 106 MMOL/L (ref 98–107)
CO2 SERPL-SCNC: 24 MMOL/L (ref 21–32)
CREAT SERPL-MCNC: 0.71 MG/DL (ref 0.5–1.3)
EGFRCR SERPLBLD CKD-EPI 2021: >90 ML/MIN/1.73M*2
EOSINOPHIL # BLD AUTO: 0.07 X10*3/UL (ref 0–0.7)
EOSINOPHIL NFR BLD AUTO: 1.3 %
ERYTHROCYTE [DISTWIDTH] IN BLOOD BY AUTOMATED COUNT: 17.3 % (ref 11.5–14.5)
GLUCOSE SERPL-MCNC: 87 MG/DL (ref 74–99)
GRAM STN SPEC: ABNORMAL
HCT VFR BLD AUTO: 29.2 % (ref 41–52)
HGB BLD-MCNC: 9.4 G/DL (ref 13.5–17.5)
IMM GRANULOCYTES # BLD AUTO: 0.26 X10*3/UL (ref 0–0.7)
IMM GRANULOCYTES NFR BLD AUTO: 4.7 % (ref 0–0.9)
LYMPHOCYTES # BLD AUTO: 0.99 X10*3/UL (ref 1.2–4.8)
LYMPHOCYTES NFR BLD AUTO: 17.8 %
MAGNESIUM SERPL-MCNC: 1.56 MG/DL (ref 1.6–2.4)
MCH RBC QN AUTO: 28.4 PG (ref 26–34)
MCHC RBC AUTO-ENTMCNC: 32.2 G/DL (ref 32–36)
MCV RBC AUTO: 88 FL (ref 80–100)
MONOCYTES # BLD AUTO: 0.88 X10*3/UL (ref 0.1–1)
MONOCYTES NFR BLD AUTO: 15.8 %
NEUTROPHILS # BLD AUTO: 3.31 X10*3/UL (ref 1.2–7.7)
NEUTROPHILS NFR BLD AUTO: 59.5 %
NRBC BLD-RTO: 1.1 /100 WBCS (ref 0–0)
P AXIS: 69 DEGREES
P OFFSET: 190 MS
P ONSET: 141 MS
PHOSPHATE SERPL-MCNC: 3.5 MG/DL (ref 2.5–4.9)
PLATELET # BLD AUTO: 65 X10*3/UL (ref 150–450)
POTASSIUM SERPL-SCNC: 4 MMOL/L (ref 3.5–5.3)
PR INTERVAL: 156 MS
PROT SERPL-MCNC: 6.3 G/DL (ref 6.4–8.2)
Q ONSET: 219 MS
QRS COUNT: 14 BEATS
QRS DURATION: 76 MS
QT INTERVAL: 364 MS
QTC CALCULATION(BAZETT): 425 MS
QTC FREDERICIA: 404 MS
R AXIS: 0 DEGREES
RBC # BLD AUTO: 3.31 X10*6/UL (ref 4.5–5.9)
SODIUM SERPL-SCNC: 135 MMOL/L (ref 136–145)
T AXIS: 26 DEGREES
T OFFSET: 401 MS
VENTRICULAR RATE: 82 BPM
WBC # BLD AUTO: 5.6 X10*3/UL (ref 4.4–11.3)

## 2024-10-12 PROCEDURE — 2500000004 HC RX 250 GENERAL PHARMACY W/ HCPCS (ALT 636 FOR OP/ED)

## 2024-10-12 PROCEDURE — 2500000001 HC RX 250 WO HCPCS SELF ADMINISTERED DRUGS (ALT 637 FOR MEDICARE OP)

## 2024-10-12 PROCEDURE — 36415 COLL VENOUS BLD VENIPUNCTURE: CPT

## 2024-10-12 PROCEDURE — 85025 COMPLETE CBC W/AUTO DIFF WBC: CPT

## 2024-10-12 PROCEDURE — 83735 ASSAY OF MAGNESIUM: CPT

## 2024-10-12 PROCEDURE — 84100 ASSAY OF PHOSPHORUS: CPT

## 2024-10-12 PROCEDURE — 80053 COMPREHEN METABOLIC PANEL: CPT

## 2024-10-12 RX ORDER — CEFUROXIME AXETIL 500 MG/1
500 TABLET ORAL 2 TIMES DAILY
Qty: 10 TABLET | Refills: 0 | Status: SHIPPED | OUTPATIENT
Start: 2024-10-12 | End: 2024-10-17

## 2024-10-12 RX ORDER — AZITHROMYCIN 500 MG/1
250 TABLET, FILM COATED ORAL DAILY
Qty: 3 TABLET | Refills: 0 | Status: SHIPPED | OUTPATIENT
Start: 2024-10-12 | End: 2024-10-17

## 2024-10-12 ASSESSMENT — PAIN SCALES - GENERAL: PAINLEVEL_OUTOF10: 0 - NO PAIN

## 2024-10-12 ASSESSMENT — PAIN - FUNCTIONAL ASSESSMENT: PAIN_FUNCTIONAL_ASSESSMENT: 0-10

## 2024-10-12 NOTE — PROGRESS NOTES
Tyler Hernandez is a 49 y.o. male on day 6 of admission presenting with Sepsis (Multi).      Subjective   Tyler Hernandez is a 49 y.o. year old male patient with no known Past Medical History. He presented to the ER due to flu like symptoms that started this past Tuesday. He says the symptoms have been on and off since then. He has chills, fevers, cough and fatigue. He also states his grandchildren were recently over and had the same type of symptoms. 3L NS given. Lactate is uptrending from 5.3 -> 8.     # Patient seen at bedside.   # Events from the last visit reviewed.   # Discussed with staff.   # Results of tests and investigations from last visit reviewed and discussed with patient/Family.  #  Electronic chart reviewed.   # Input / Recommendations  from other care providers appreciated and reviewed.  # Some parts of this chart are copied from previous encounters, pertinent changes from today are updated.  # Parts of this chart have been completed using voice recognition software. Please excuse any errors of transcription.      Objective     Last Recorded Vitals  /60 (BP Location: Left arm, Patient Position: Lying)   Pulse 84   Temp 37 °C (98.6 °F) (Temporal)   Resp 18   Wt 95.5 kg (210 lb 8.6 oz)   SpO2 94%   Intake/Output last 3 Shifts:    Intake/Output Summary (Last 24 hours) at 10/12/2024 1519  Last data filed at 10/12/2024 0900  Gross per 24 hour   Intake 720 ml   Output --   Net 720 ml       Admission Weight  Weight: 90.3 kg (199 lb) (10/06/24 1942)    Daily Weight  10/12/24 : 95.5 kg (210 lb 8.6 oz)    Image Results  Electrocardiogram, 12-lead PRN ACS symptoms  Sinus rhythm with Premature atrial complexes  Otherwise normal ECG  No previous ECGs available  Confirmed by Adria Roberson ([id number]) on 10/12/2024 8:33:15 AM      Physical Exam    Constitutional:       Appearance: Normal appearance.   Eyes:      Pupils: Pupils are equal, round, and reactive to light.   Cardiovascular:      Rate and  Rhythm: Normal rate and regular rhythm.   Pulmonary:      Breath sounds: Rhonchi present.   Abdominal:      General: Abdomen is flat.      Palpations: Abdomen is soft.   Musculoskeletal:         General: Normal range of motion.      Cervical back: Normal range of motion.   Skin:     General: Skin is warm and dry.   Neurological:      Mental Status: He is alert and oriented to person, place, and time.     Relevant Results  Results for orders placed or performed during the hospital encounter of 10/06/24 (from the past 24 hour(s))   Sars-CoV-2 PCR   Result Value Ref Range    Coronavirus 2019, PCR Not Detected Not Detected   Influenza A, and B PCR   Result Value Ref Range    Flu A Result Not Detected Not Detected    Flu B Result Not Detected Not Detected   RSV PCR   Result Value Ref Range    RSV PCR Not Detected Not Detected   CBC and Auto Differential   Result Value Ref Range    WBC 5.6 4.4 - 11.3 x10*3/uL    nRBC 1.1 (H) 0.0 - 0.0 /100 WBCs    RBC 3.31 (L) 4.50 - 5.90 x10*6/uL    Hemoglobin 9.4 (L) 13.5 - 17.5 g/dL    Hematocrit 29.2 (L) 41.0 - 52.0 %    MCV 88 80 - 100 fL    MCH 28.4 26.0 - 34.0 pg    MCHC 32.2 32.0 - 36.0 g/dL    RDW 17.3 (H) 11.5 - 14.5 %    Platelets 65 (L) 150 - 450 x10*3/uL    Neutrophils % 59.5 40.0 - 80.0 %    Immature Granulocytes %, Automated 4.7 (H) 0.0 - 0.9 %    Lymphocytes % 17.8 13.0 - 44.0 %    Monocytes % 15.8 2.0 - 10.0 %    Eosinophils % 1.3 0.0 - 6.0 %    Basophils % 0.9 0.0 - 2.0 %    Neutrophils Absolute 3.31 1.20 - 7.70 x10*3/uL    Immature Granulocytes Absolute, Automated 0.26 0.00 - 0.70 x10*3/uL    Lymphocytes Absolute 0.99 (L) 1.20 - 4.80 x10*3/uL    Monocytes Absolute 0.88 0.10 - 1.00 x10*3/uL    Eosinophils Absolute 0.07 0.00 - 0.70 x10*3/uL    Basophils Absolute 0.05 0.00 - 0.10 x10*3/uL   Comprehensive Metabolic Panel   Result Value Ref Range    Glucose 87 74 - 99 mg/dL    Sodium 135 (L) 136 - 145 mmol/L    Potassium 4.0 3.5 - 5.3 mmol/L    Chloride 106 98 - 107 mmol/L     Bicarbonate 24 21 - 32 mmol/L    Anion Gap 9 (L) 10 - 20 mmol/L    Urea Nitrogen 9 6 - 23 mg/dL    Creatinine 0.71 0.50 - 1.30 mg/dL    eGFR >90 >60 mL/min/1.73m*2    Calcium 8.3 (L) 8.6 - 10.3 mg/dL    Albumin 2.5 (L) 3.4 - 5.0 g/dL    Alkaline Phosphatase 84 33 - 120 U/L    Total Protein 6.3 (L) 6.4 - 8.2 g/dL    AST 74 (H) 9 - 39 U/L    Bilirubin, Total 1.1 0.0 - 1.2 mg/dL    ALT 33 10 - 52 U/L   Magnesium   Result Value Ref Range    Magnesium 1.56 (L) 1.60 - 2.40 mg/dL   Phosphorus   Result Value Ref Range    Phosphorus 3.5 2.5 - 4.9 mg/dL         Assessment/Plan        # Sepsis d/t pneumonia - improving slowly with atbs  - CT : Consolidative opacity in the right upper lobe compatible with  pneumonia.  # ETOH abuse-Encourage ETOH cessation, CIWA.   # Electrolyte disturbance-D/t the above, monitor lab work.   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  Okay for discharge home today    10/12-patient feels better, ID input noted, planning discharge today.    Discharge is planned for today . Discharge medications were reconciled. Discharge orders completed. Hospital course , medication changes and Investigation's conducted were reviewed with the patient / Family. Activity, Diet and Medications after discharge were reviewed with the patient / Family. Medication reconciliation done - pls refer to medication reconciliation sheet.Follow up with Primary Care Physician were reviewed with the patient / Family. Discharge planning was discussed with staff. Refer to discharge orders sheet. Total time to coordinate  disch process incl counseling family, coordinating with other care givers,  and pharmacist was >35 min.   # Patient seen at bedside.   # Events from the last visit reviewed.   # Discussed with staff.   # Results of tests and investigations from last visit reviewed and discussed with patient/Family.  #  Electronic chart reviewed.   # Input / Recommendations  from other care providers appreciated and reviewed.  # Some parts of this chart are copied from previous encounters, pertinent changes from today are updated.  # Parts of this chart have been completed using voice recognition software. Please excuse any errors of transcription.      Christian Marcos MD

## 2024-10-12 NOTE — DISCHARGE SUMMARY
Discharge Diagnosis  Sepsis (Multi)    Issues Requiring Follow-Up  PCP    Test Results Pending At Discharge  Pending Labs       Order Current Status    Respiratory Culture/Smear In process    Blood Culture Preliminary result    Blood Culture Preliminary result            Hospital Course  # Sepsis d/t pneumonia - improving slowly with atbs  - CT : Consolidative opacity in the right upper lobe compatible with  pneumonia.  # ETOH abuse-Encourage ETOH cessation, CIWA.   # Electrolyte disturbance-D/t the above, monitor lab work.   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  Okay for discharge home today    10/12-patient feels better, ID input noted, planning discharge today.    Discharge is planned for today . Discharge medications were reconciled. Discharge orders completed. Hospital course , medication changes and Investigation's conducted were reviewed with the patient / Family. Activity, Diet and Medications after discharge were reviewed with the patient / Family. Medication reconciliation done - pls refer to medication reconciliation sheet.Follow up with Primary Care Physician were reviewed with the patient / Family. Discharge planning was discussed with staff. Refer to discharge orders sheet. Total time to coordinate disch process incl counseling family, coordinating with other care givers,  and pharmacist was >35 min.   # Patient seen at bedside.   # Events from the last visit reviewed.   # Discussed with staff.   # Results of tests and investigations from last visit reviewed and discussed  with patient/Family.  #  Electronic chart reviewed.   # Input / Recommendations  from other care providers appreciated and reviewed.  # Some parts of this chart are copied from previous encounters, pertinent changes from today are updated.  # Parts of this chart have been completed using voice recognition software. Please excuse any errors of transcription.    Outpatient Follow-Up  No future appointments.    Christian Marcos MD

## 2024-10-12 NOTE — CARE PLAN
Problem: Pain - Adult  Goal: Verbalizes/displays adequate comfort level or baseline comfort level  Outcome: Progressing   The patient's goals for the shift include safety.     The clinical goals for the shift include comfort.    Over the shift, the patient did not make progress toward the following goals. Barriers to progression include none. Recommendations to address these barriers include none.

## 2024-10-12 NOTE — PROGRESS NOTES
Tyler Hernandez is a 49 y.o. male on day 6 of admission presenting with Sepsis (Multi).      Subjective   Tyler Hernandez is a 49 y.o. year old male patient with no known Past Medical History. He presented to the ER due to flu like symptoms that started this past Tuesday. He says the symptoms have been on and off since then. He has chills, fevers, cough and fatigue. He also states his grandchildren were recently over and had the same type of symptoms. 3L NS given. Lactate is uptrending from 5.3 -> 8.     # Patient seen at bedside.   # Events from the last visit reviewed.   # Discussed with staff.   # Results of tests and investigations from last visit reviewed and discussed with patient/Family.  #  Electronic chart reviewed.   # Input / Recommendations  from other care providers appreciated and reviewed.  # Some parts of this chart are copied from previous encounters, pertinent changes from today are updated.  # Parts of this chart have been completed using voice recognition software. Please excuse any errors of transcription.      Objective     Last Recorded Vitals  /60 (BP Location: Left arm, Patient Position: Lying)   Pulse 84   Temp 37 °C (98.6 °F) (Temporal)   Resp 18   Wt 95.5 kg (210 lb 8.6 oz)   SpO2 94%   Intake/Output last 3 Shifts:  No intake or output data in the 24 hours ending 10/12/24 1256      Admission Weight  Weight: 90.3 kg (199 lb) (10/06/24 1942)    Daily Weight  10/12/24 : 95.5 kg (210 lb 8.6 oz)    Image Results  Electrocardiogram, 12-lead PRN ACS symptoms  Sinus rhythm with Premature atrial complexes  Otherwise normal ECG  No previous ECGs available  Confirmed by Adria Roberson ([id number]) on 10/12/2024 8:33:15 AM      Physical Exam    Constitutional:       Appearance: Normal appearance.   Eyes:      Pupils: Pupils are equal, round, and reactive to light.   Cardiovascular:      Rate and Rhythm: Normal rate and regular rhythm.   Pulmonary:      Breath sounds: Rhonchi present.    Abdominal:      General: Abdomen is flat.      Palpations: Abdomen is soft.   Musculoskeletal:         General: Normal range of motion.      Cervical back: Normal range of motion.   Skin:     General: Skin is warm and dry.   Neurological:      Mental Status: He is alert and oriented to person, place, and time.     Relevant Results  Results for orders placed or performed during the hospital encounter of 10/06/24 (from the past 24 hour(s))   Sars-CoV-2 PCR   Result Value Ref Range    Coronavirus 2019, PCR Not Detected Not Detected   Influenza A, and B PCR   Result Value Ref Range    Flu A Result Not Detected Not Detected    Flu B Result Not Detected Not Detected   RSV PCR   Result Value Ref Range    RSV PCR Not Detected Not Detected   CBC and Auto Differential   Result Value Ref Range    WBC 5.6 4.4 - 11.3 x10*3/uL    nRBC 1.1 (H) 0.0 - 0.0 /100 WBCs    RBC 3.31 (L) 4.50 - 5.90 x10*6/uL    Hemoglobin 9.4 (L) 13.5 - 17.5 g/dL    Hematocrit 29.2 (L) 41.0 - 52.0 %    MCV 88 80 - 100 fL    MCH 28.4 26.0 - 34.0 pg    MCHC 32.2 32.0 - 36.0 g/dL    RDW 17.3 (H) 11.5 - 14.5 %    Platelets 65 (L) 150 - 450 x10*3/uL    Neutrophils % 59.5 40.0 - 80.0 %    Immature Granulocytes %, Automated 4.7 (H) 0.0 - 0.9 %    Lymphocytes % 17.8 13.0 - 44.0 %    Monocytes % 15.8 2.0 - 10.0 %    Eosinophils % 1.3 0.0 - 6.0 %    Basophils % 0.9 0.0 - 2.0 %    Neutrophils Absolute 3.31 1.20 - 7.70 x10*3/uL    Immature Granulocytes Absolute, Automated 0.26 0.00 - 0.70 x10*3/uL    Lymphocytes Absolute 0.99 (L) 1.20 - 4.80 x10*3/uL    Monocytes Absolute 0.88 0.10 - 1.00 x10*3/uL    Eosinophils Absolute 0.07 0.00 - 0.70 x10*3/uL    Basophils Absolute 0.05 0.00 - 0.10 x10*3/uL   Comprehensive Metabolic Panel   Result Value Ref Range    Glucose 87 74 - 99 mg/dL    Sodium 135 (L) 136 - 145 mmol/L    Potassium 4.0 3.5 - 5.3 mmol/L    Chloride 106 98 - 107 mmol/L    Bicarbonate 24 21 - 32 mmol/L    Anion Gap 9 (L) 10 - 20 mmol/L    Urea Nitrogen 9 6 -  23 mg/dL    Creatinine 0.71 0.50 - 1.30 mg/dL    eGFR >90 >60 mL/min/1.73m*2    Calcium 8.3 (L) 8.6 - 10.3 mg/dL    Albumin 2.5 (L) 3.4 - 5.0 g/dL    Alkaline Phosphatase 84 33 - 120 U/L    Total Protein 6.3 (L) 6.4 - 8.2 g/dL    AST 74 (H) 9 - 39 U/L    Bilirubin, Total 1.1 0.0 - 1.2 mg/dL    ALT 33 10 - 52 U/L   Magnesium   Result Value Ref Range    Magnesium 1.56 (L) 1.60 - 2.40 mg/dL   Phosphorus   Result Value Ref Range    Phosphorus 3.5 2.5 - 4.9 mg/dL         Assessment/Plan        # Sepsis d/t pneumonia - improving slowly with atbs  - CT : Consolidative opacity in the right upper lobe compatible with  pneumonia.  # ETOH abuse-Encourage ETOH cessation, CIWA.   # Electrolyte disturbance-D/t the above, monitor lab work.   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  Okay for discharge home today    # Patient seen at bedside.   # Events from the last visit reviewed.   # Discussed with staff.   # Results of tests and investigations from last visit reviewed and discussed with patient/Family.  #  Electronic chart reviewed.   # Input / Recommendations  from other care providers appreciated and reviewed.  # Some parts of this chart are copied from previous encounters, pertinent changes from today are updated.  # Parts of this chart have been completed using voice recognition software. Please excuse any errors of transcription.      Christian Marcos MD

## 2024-10-12 NOTE — PROGRESS NOTES
ID has signed off,  they are ok to change his AB to PO for dc. Message sent to attending to see if he can be discharged today.

## 2024-10-12 NOTE — PROGRESS NOTES
Tyler Hernandez is a 49 y.o. male on day 6 of admission presenting with Sepsis (Multi).    Subjective   Interval History:   Episode of low-grade fever  No chills  No cough or chest pain  No nausea vomiting or diarrhea.  Mild right knee pain    Review of Systems   All other systems reviewed and are negative.      Objective   Range of Vitals (last 24 hours)  Heart Rate:  [75-81]   Temp:  [36.8 °C (98.2 °F)-38 °C (100.4 °F)]   Resp:  [17-19]   BP: ()/(46-69)   Weight:  [95.5 kg (210 lb 8.6 oz)]   SpO2:  [93 %-100 %]   Daily Weight  10/12/24 : 95.5 kg (210 lb 8.6 oz)    Body mass index is 27.02 kg/m².    Physical Exam  Constitutional:       Appearance: Normal appearance.   HENT:      Head: Normocephalic and atraumatic.   Eyes:      Extraocular Movements: Extraocular movements intact.      Conjunctiva/sclera: Conjunctivae normal.   Cardiovascular:      Rate and Rhythm: Normal rate and regular rhythm.   Pulmonary:      Effort: Pulmonary effort is normal.      Comments: Diminished bilaterally  Abdominal:      General: Bowel sounds are normal.      Palpations: Abdomen is soft.      Tenderness: There is no abdominal tenderness.   Musculoskeletal:      Cervical back: Normal range of motion.      Comments: Right anterior knee swelling   Skin:     General: Skin is warm and dry.   Neurological:      General: No focal deficit present.      Mental Status: He is alert and oriented to person, place, and time.   Psychiatric:         Mood and Affect: Mood normal.         Behavior: Behavior normal.     Antibiotics  azithromycin - 500 mg  cefTRIAXone - 1 gram/50 mL    Relevant Results  Labs  Results from last 72 hours   Lab Units 10/12/24  0443 10/11/24  0433 10/10/24  1742 10/10/24  0437   WBC AUTO x10*3/uL 5.6 5.4 5.6 5.4   HEMOGLOBIN g/dL 9.4* 10.0* 10.0* 9.2*   HEMATOCRIT % 29.2* 30.5* 30.1* 27.9*   PLATELETS AUTO x10*3/uL 65* 61* 55* 55*   NEUTROS PCT AUTO % 59.5  --   --  55.7   LYMPHO PCT MAN %  --  20.0  --   --    LYMPHS  "PCT AUTO % 17.8  --   --  22.3   MONO PCT MAN %  --  11.0  --   --    MONOS PCT AUTO % 15.8  --   --  16.8   EOSINO PCT MAN %  --  0.0  --   --    EOS PCT AUTO % 1.3  --   --  1.5     Results from last 72 hours   Lab Units 10/12/24  0443 10/11/24  0433 10/10/24  1742 10/10/24  0437   SODIUM mmol/L 135* 135* 131* 133*   POTASSIUM mmol/L 4.0 3.8 3.9 3.7   CHLORIDE mmol/L 106 107 103 105   CO2 mmol/L 24 23 21 24   BUN mg/dL 9 9 9 10   CREATININE mg/dL 0.71 0.75 0.77 0.76   GLUCOSE mg/dL 87 131* 152* 85   CALCIUM mg/dL 8.3* 8.2* 8.2* 7.8*   ANION GAP mmol/L 9* 9* 11 8*   EGFR mL/min/1.73m*2 >90 >90 >90 >90   PHOSPHORUS mg/dL 3.5 3.5  --  3.7     Results from last 72 hours   Lab Units 10/12/24  0443 10/11/24  0433 10/10/24  1742   ALK PHOS U/L 84 82 86   BILIRUBIN TOTAL mg/dL 1.1 1.1 1.3*   PROTEIN TOTAL g/dL 6.3* 6.6 7.0   ALT U/L 33 26 26   AST U/L 74* 60* 63*   ALBUMIN g/dL 2.5* 2.6* 2.8*     Estimated Creatinine Clearance: 125 mL/min (by C-G formula based on SCr of 0.71 mg/dL).  No results found for: \"CRP\"  Microbiology  Reviewed  Imaging  Electrocardiogram, 12-lead PRN ACS symptoms    Result Date: 10/12/2024  Sinus rhythm with Premature atrial complexes Otherwise normal ECG No previous ECGs available Confirmed by Adria Roberson ([id number]) on 10/12/2024 8:33:15 AM    XR knee right 3 views    Result Date: 10/11/2024  Interpreted By:  Elizabeth Castaneda, STUDY: XR KNEE RIGHT 3 VIEWS 10/11/2024 12:38 pm   INDICATION: Signs/Symptoms:R knee pain   COMPARISON: None available.   ACCESSION NUMBER(S): [id number]   ORDERING CLINICIAN: JAMES VEGAS   TECHNIQUE: Three views of right knee   FINDINGS: The joint compartments are maintained. There is minimal osteophytosis of the medial and lateral tibial plateaus. Mild enthesopathy along the superior patellar pole is present. There is a moderately small joint effusion. No fracture, dislocation, or acute bony abnormality is seen.       Moderately small suprapatellar effusion with very " mild osteoarthritis of the right knee.   Signed by: Elizabeth Castaneda 10/11/2024 1:22 PM Dictation workstation:   [id number]    US gallbladder    Result Date: 10/7/2024  Interpreted By:  Marques Peres, STUDY: US GALLBLADDER  10/7/2024 7:18 pm   INDICATION: 50 y/o   M with  Signs/Symptoms:Concerns for cholelithatisis on ct.     COMPARISON: None.   ACCESSION NUMBER(S): [id number]   ORDERING CLINICIAN: SHARONA UGARTE   TECHNIQUE: Routine ultrasound of the right upper quadrant was performed.  Static images were obtained for remote interpretation.   FINDINGS: LIVER: Craniocaudal length:  20 cm,  within normal limits of size for age. Echogenicity:  Mildly increased Mass:  None.   BILE DUCTS: Intrahepatic ducts: Non-dilated. Common bile duct diameter: 3 mm.   GALLBLADDER: Gallbladder:  Normal. Gallstones:  There are multiple stones and sludge noted within the gallbladder Gallbladder sludge:  See above. Gallbladder wall thickening:  There is gallbladder wall thickening noted.. Negative sonographic Rivera's sign. Pericholecystic fluid: None.   PANCREAS:   Visualized portions are unremarkable.   RIGHT KIDNEY: Craniocaudal length:  12.1 cm,  within normal limits of size for age. No hydronephrosis, hydroureter or focal renal lesion.   PERITONEAL FLUID:   There is trace ascites noted in the right upper quadrant.       Multiple stones noted within a collapsed gallbladder with wall thickening.   Fatty infiltration of the enlarged liver.   Trace free fluid in the right upper quadrant.   MACRO: None   Signed by: Marques Peres 10/7/2024 9:53 PM Dictation workstation:   [id number]    CT angio chest for pulmonary embolism    Result Date: 10/7/2024  Interpreted By:  Franco Keene, STUDY: CT ABDOMEN PELVIS WO IV CONTRAST; CT ANGIO CHEST FOR PULMONARY EMBOLISM;  10/7/2024 1:21 am; 10/7/2024 1:22 am   INDICATION: Signs/Symptoms:Hernia abdominal pain; Signs/Symptoms:SOB.   COMPARISON: None.   ACCESSION NUMBER(S): [id number]; [id number]    ORDERING CLINICIAN: MARIA ALEJANDRA MENDEZ   TECHNIQUE: Contiguous axial images of the abdomen and pelvis were obtained without intravenous contrast. Contiguous axial images of the chest were obtained after the intravenous administration of contrast. Coronal and sagittal reformatted images were obtained from the axial images. MIPS of the chest were also performed and reviewed.   FINDINGS: CT CHEST:   No axillary, mediastinal or hilar lymphadenopathy.   The heart is normal in size. No significant pericardial effusion. No evidence of acute central, main, lobar or proximal segmental pulmonary embolism.   There is consolidative opacity in the right upper lobe compatible with pneumonia. There is pulmonary emphysema. No significant pleural effusion. No pneumothorax.   Mild degenerative change of the thoracic spine.   CT ABDOMEN AND PELVIS:   Evaluation of the abdominal viscera is limited secondary to lack of intravenous contrast.   There is hepatic steatosis and nodularity of liver contour compatible with cirrhosis. Limited evaluation for liver mass on noncontrast examination. There is cholelithiasis and pericholecystic edema.   There is mild peripancreatic edema.   No splenomegaly.   The adrenal glands appear unremarkable.   No evidence of renal calculus or hydronephrosis. Evaluation of the kidneys is otherwise limited secondary lack of intravenous contrast.   There is mild abdominal and pelvic ascites.   There is 5.4 cm x 3.3 cm umbilical hernia with herniation of ascites.   The stomach is underdistended and not well evaluated. No evidence of bowel obstruction. There is wall thickening of segments of colon. There is colonic diverticulosis.   Urinary bladder is underdistended and not well evaluated.   There are pars defects and grade 1 anterolisthesis of L5 on S1.       No evidence of acute pulmonary embolism.   Consolidative opacity in the right upper lobe compatible with pneumonia.   Hepatic steatosis and liver cirrhosis with  mild abdominal and pelvic ascites. Cholelithiasis and gallbladder wall thickness/pericholecystic edema may relate to the underlying liver disease, however clinical correlation recommended if there is concern for acute cholecystitis in the setting for right upper quadrant pain and ultrasound may be obtained for further evaluation.   Colonic wall thickening which may be secondary to hypoproteinemia, or active to ascites or infectious/inflammatory in etiology.   Colonic diverticulosis.   MACRO: None   Signed by: Franco Keene 10/7/2024 2:38 AM Dictation workstation:   [id number]    CT abdomen pelvis wo IV contrast    Result Date: 10/7/2024  Interpreted By:  Franco Keene, STUDY: CT ABDOMEN PELVIS WO IV CONTRAST; CT ANGIO CHEST FOR PULMONARY EMBOLISM;  10/7/2024 1:21 am; 10/7/2024 1:22 am   INDICATION: Signs/Symptoms:Hernia abdominal pain; Signs/Symptoms:SOB.   COMPARISON: None.   ACCESSION NUMBER(S): [id number]; [id number]   ORDERING CLINICIAN: MARIA ALEJANDRA MENDEZ   TECHNIQUE: Contiguous axial images of the abdomen and pelvis were obtained without intravenous contrast. Contiguous axial images of the chest were obtained after the intravenous administration of contrast. Coronal and sagittal reformatted images were obtained from the axial images. MIPS of the chest were also performed and reviewed.   FINDINGS: CT CHEST:   No axillary, mediastinal or hilar lymphadenopathy.   The heart is normal in size. No significant pericardial effusion. No evidence of acute central, main, lobar or proximal segmental pulmonary embolism.   There is consolidative opacity in the right upper lobe compatible with pneumonia. There is pulmonary emphysema. No significant pleural effusion. No pneumothorax.   Mild degenerative change of the thoracic spine.   CT ABDOMEN AND PELVIS:   Evaluation of the abdominal viscera is limited secondary to lack of intravenous contrast.   There is hepatic steatosis and nodularity of liver contour compatible with  cirrhosis. Limited evaluation for liver mass on noncontrast examination. There is cholelithiasis and pericholecystic edema.   There is mild peripancreatic edema.   No splenomegaly.   The adrenal glands appear unremarkable.   No evidence of renal calculus or hydronephrosis. Evaluation of the kidneys is otherwise limited secondary lack of intravenous contrast.   There is mild abdominal and pelvic ascites.   There is 5.4 cm x 3.3 cm umbilical hernia with herniation of ascites.   The stomach is underdistended and not well evaluated. No evidence of bowel obstruction. There is wall thickening of segments of colon. There is colonic diverticulosis.   Urinary bladder is underdistended and not well evaluated.   There are pars defects and grade 1 anterolisthesis of L5 on S1.       No evidence of acute pulmonary embolism.   Consolidative opacity in the right upper lobe compatible with pneumonia.   Hepatic steatosis and liver cirrhosis with mild abdominal and pelvic ascites. Cholelithiasis and gallbladder wall thickness/pericholecystic edema may relate to the underlying liver disease, however clinical correlation recommended if there is concern for acute cholecystitis in the setting for right upper quadrant pain and ultrasound may be obtained for further evaluation.   Colonic wall thickening which may be secondary to hypoproteinemia, or active to ascites or infectious/inflammatory in etiology.   Colonic diverticulosis.   MACRO: None   Signed by: Franco Keene 10/7/2024 2:38 AM Dictation workstation:   [id number]    XR chest 2 views    Result Date: 10/6/2024  Interpreted By:  Tod Johnson, STUDY: XR CHEST 2 VIEWS;  10/6/2024 8:44 pm   INDICATION: Signs/Symptoms:r/o PNA.     COMPARISON: None.   ACCESSION NUMBER(S): [id number]   ORDERING CLINICIAN: JAIRO GLEZ   FINDINGS:         CARDIOMEDIASTINAL SILHOUETTE: Cardiomediastinal silhouette is normal in size and configuration.   LUNGS: Dense airspace disease in the right upper  lobe. The left lung is clear. There is no effusion. There is no edema   ABDOMEN: No remarkable upper abdominal findings.   BONES: No acute osseous changes.       1. Right upper lobe pneumonia. Radiographic follow-up to resolution in 3-4 weeks advised       MACRO: None   Signed by: Tod Johnson 10/6/2024 9:00 PM Dictation workstation:   [id number]    Assessment/Plan   Community-acquired pneumonia  Fever secondary to pneumonia, improving  Right knee swelling-ortho  note reviewed, suspected patellar tendinitis     Continue ceftriaxone  Continue azithromycin  Follow-up blood cultures  Follow-up sputum culture  Supportive care  Monitor temperature and WBC  Discharge planning-can change to cefuroxime and azithromycin for total of 5 days      Fritz Romero MD

## 2024-10-13 LAB
BACTERIA BLD CULT: NORMAL
BACTERIA BLD CULT: NORMAL

## 2024-10-15 LAB
BACTERIA BLD CULT: NORMAL
BACTERIA BLD CULT: NORMAL

## 2024-11-13 ENCOUNTER — LAB (OUTPATIENT)
Dept: LAB | Facility: LAB | Age: 49
End: 2024-11-13
Payer: COMMERCIAL

## 2024-11-13 DIAGNOSIS — D64.9 ANEMIA, UNSPECIFIED: Primary | ICD-10-CM

## 2024-11-13 DIAGNOSIS — K70.30 ALCOHOLIC CIRRHOSIS OF LIVER WITHOUT ASCITES (MULTI): ICD-10-CM

## 2024-11-13 PROCEDURE — 82105 ALPHA-FETOPROTEIN SERUM: CPT

## 2024-11-13 PROCEDURE — 36415 COLL VENOUS BLD VENIPUNCTURE: CPT

## 2024-11-13 PROCEDURE — 86705 HEP B CORE ANTIBODY IGM: CPT

## 2024-11-13 PROCEDURE — 87340 HEPATITIS B SURFACE AG IA: CPT

## 2024-11-13 PROCEDURE — 86706 HEP B SURFACE ANTIBODY: CPT

## 2024-11-13 PROCEDURE — 82728 ASSAY OF FERRITIN: CPT

## 2024-11-13 PROCEDURE — 82746 ASSAY OF FOLIC ACID SERUM: CPT

## 2024-11-13 PROCEDURE — 83540 ASSAY OF IRON: CPT

## 2024-11-13 PROCEDURE — 86803 HEPATITIS C AB TEST: CPT

## 2024-11-13 PROCEDURE — 86708 HEPATITIS A ANTIBODY: CPT

## 2024-11-14 LAB
AFP SERPL-MCNC: 5 NG/ML (ref 0–9)
FERRITIN SERPL-MCNC: 67 NG/ML (ref 20–300)
FOLATE SERPL-MCNC: 9.2 NG/ML
HAV AB SER QL IA: REACTIVE
HBV CORE IGM SER QL: NONREACTIVE
HBV SURFACE AB SER-ACNC: 3.6 MIU/ML
HBV SURFACE AG SERPL QL IA: NONREACTIVE
HCV AB SER QL: NONREACTIVE
IRON SERPL-MCNC: 76 UG/DL (ref 35–150)

## 2025-01-23 ENCOUNTER — PRE-ADMISSION TESTING (OUTPATIENT)
Dept: PREADMISSION TESTING | Facility: HOSPITAL | Age: 50
End: 2025-01-23
Payer: COMMERCIAL

## 2025-01-23 VITALS
TEMPERATURE: 98.6 F | BODY MASS INDEX: 25.54 KG/M2 | HEART RATE: 83 BPM | OXYGEN SATURATION: 98 % | SYSTOLIC BLOOD PRESSURE: 120 MMHG | DIASTOLIC BLOOD PRESSURE: 73 MMHG | HEIGHT: 74 IN | WEIGHT: 199 LBS

## 2025-01-23 PROCEDURE — 99204 OFFICE O/P NEW MOD 45 MIN: CPT

## 2025-01-23 RX ORDER — MAGNESIUM HYDROXIDE 400 MG/5ML
1 SUSPENSION, ORAL (FINAL DOSE FORM) ORAL DAILY
COMMUNITY

## 2025-01-23 RX ORDER — CALCIUM CARBONATE 300MG(750)
400 TABLET,CHEWABLE ORAL DAILY
COMMUNITY

## 2025-01-23 RX ORDER — ACETAMINOPHEN 500 MG
1000 TABLET ORAL EVERY 6 HOURS PRN
COMMUNITY

## 2025-01-23 RX ORDER — MULTIVIT-MIN/IRON FUM/FOLIC AC 7.5 MG-4
1 TABLET ORAL DAILY
COMMUNITY

## 2025-01-23 RX ORDER — OMEPRAZOLE 40 MG/1
40 CAPSULE, DELAYED RELEASE ORAL
COMMUNITY
Start: 2024-10-28

## 2025-01-23 RX ORDER — FERROUS SULFATE 325(65) MG
325 TABLET ORAL
COMMUNITY
Start: 2024-10-28

## 2025-01-23 ASSESSMENT — DUKE ACTIVITY SCORE INDEX (DASI)
CAN YOU DO YARD WORK LIKE RAKING LEAVES, WEEDING OR PUSHING A MOWER: YES
CAN YOU HAVE SEXUAL RELATIONS: YES
CAN YOU DO LIGHT WORK AROUND THE HOUSE LIKE DUSTING OR WASHING DISHES: YES
CAN YOU PARTICIPATE IN MODERATE RECREATIONAL ACTIVITIES LIKE GOLF, BOWLING, DANCING, DOUBLES TENNIS OR THROWING A BASEBALL OR FOOTBALL: YES
CAN YOU DO HEAVY WORK AROUND THE HOUSE LIKE SCRUBBING FLOORS OR LIFTING AND MOVING HEAVY FURNITURE: YES
TOTAL_SCORE: 50.7
CAN YOU DO MODERATE WORK AROUND THE HOUSE LIKE VACUUMING, SWEEPING FLOORS OR CARRYING GROCERIES: YES
CAN YOU CLIMB A FLIGHT OF STAIRS OR WALK UP A HILL: YES
CAN YOU RUN A SHORT DISTANCE: YES
DASI METS SCORE: 9
CAN YOU TAKE CARE OF YOURSELF (EAT, DRESS, BATHE, OR USE TOILET): YES
CAN YOU PARTICIPATE IN STRENOUS SPORTS LIKE SWIMMING, SINGLES TENNIS, FOOTBALL, BASKETBALL, OR SKIING: NO
CAN YOU WALK INDOORS, SUCH AS AROUND YOUR HOUSE: YES
CAN YOU WALK A BLOCK OR TWO ON LEVEL GROUND: YES

## 2025-01-23 ASSESSMENT — ENCOUNTER SYMPTOMS
RESPIRATORY NEGATIVE: 1
NEUROLOGICAL NEGATIVE: 1
MUSCULOSKELETAL NEGATIVE: 1
PSYCHIATRIC NEGATIVE: 1
CONSTITUTIONAL NEGATIVE: 1
ALLERGIC/IMMUNOLOGIC NEGATIVE: 1
ENDOCRINE NEGATIVE: 1
EYES NEGATIVE: 1
HEMATOLOGIC/LYMPHATIC NEGATIVE: 1
ABDOMINAL DISTENTION: 1
CARDIOVASCULAR NEGATIVE: 1

## 2025-01-23 ASSESSMENT — PAIN - FUNCTIONAL ASSESSMENT: PAIN_FUNCTIONAL_ASSESSMENT: 0-10

## 2025-01-23 ASSESSMENT — PAIN SCALES - GENERAL: PAINLEVEL_OUTOF10: 0 - NO PAIN

## 2025-01-23 NOTE — PREPROCEDURE INSTRUCTIONS
Medication List            Accurate as of January 23, 2025  9:36 AM. Always use your most recent med list.                acetaminophen 500 mg tablet  Commonly known as: Tylenol  Medication Adjustments for Surgery: Take/Use as prescribed     ferrous sulfate (325 mg ferrous sulfate) tablet  Medication Adjustments for Surgery: Do Not take on the morning of surgery     magnesium oxide 400 mg tablet  Commonly known as: Mag-Ox  Additional Medication Adjustments for Surgery: Take last dose 7 days before surgery     multivitamin with minerals tablet  Additional Medication Adjustments for Surgery: Take last dose 7 days before surgery     omeprazole 40 mg DR capsule  Commonly known as: PriLOSEC  Medication Adjustments for Surgery: Do Not take on the morning of surgery     potassium gluconate 595 mg (99 mg) tablet  Additional Medication Adjustments for Surgery: Take last dose 7 days before surgery                 DEFER TO AND FOLLOW INSTRUCTIONS PROVIDED BY DR. RUSSEL DEVINEO Instructions:    Do not eat any food after midnight the night before your surgery/procedure.    Additional Instructions:     Day of Surgery:  Review your medication instructions, take indicated medications  Wear  comfortable loose fitting clothing  Do not use moisturizers, creams, lotions or perfume  All jewelry and valuables should be left at home          Why must I stop eating and drinking near surgery time?  With sedation, food or liquid in your stomach can enter your lungs causing serious complications  Increases nausea and vomiting    When do I need to stop eating and drinking before my surgery?   Do not eat or drink after midnight the night before your surgery/procedure.  You may have small sips of water to take your medication.    PAT DISCHARGE INSTRUCTIONS    Please call the Same Day Surgery (SDS) Department of the hospital where your procedure will be performed after 2:00 PM the day before your surgery. If you are scheduled on a  Monday, or a Tuesday following a Monday holiday, you will need to call on the last business day prior to your surgery.    [facility]  [id number] [place] [address], [id number] [phone]  Second Floor      Please let your surgeon know if:      You develop any open sores, shingles, burning or painful urination as these may increase your risk of an infection.   You no longer wish to have the surgery.   Any other personal circumstances change that may lead to the need to cancel or defer this surgery-such as being sick or getting admitted to any hospital within one week of your planned procedure.    Your contact details change, such as a change of address or phone number.    Starting now:     Please DO NOT drink alcohol or smoke for 24 hours before surgery. It is well known that quitting smoking can make a huge difference to your health and recovery from surgery. The longer you abstain from smoking, the better your chances of a healthy recovery. If you need help with quitting, call 6-776-QUIT-NOW to be connected to a trained counselor who will discuss the best methods to help you quit.     Before your surgery:    Please stop all supplements INCLUDING MARIJUANA 7 days prior to surgery. Or as directed by your surgeon.   Please stop taking NSAID pain medicine such as Advil and Motrin 7 days before surgery.    If you develop any fever, cough, cold, rashes, cuts, scratches, scrapes, urinary symptoms or infection anywhere on your body (including teeth and gums) prior to surgery, please call your surgeon’s office as soon as possible. This may require treatment to reduce the chance of cancellation on the day of surgery.    The day before your surgery:   DIET- Please follow the diet instructions at the top of your packet.   Get a good night’s rest.  Use the special soap for bathing if you have been instructed to use one.    Scheduled surgery times may change and you will be notified if this  occurs - please check your personal voicemail for any updates.     On the morning of surgery:   Wear comfortable, loose fitting clothes which open in the front. Please do not wear moisturizers, creams, lotions, makeup or perfume.    Please bring with you to surgery:   Photo ID and insurance card   Current list of medicines and allergies   Pacemaker/ Defibrillator/Heart stent cards   CPAP machine and mask    Slings/ splints/ crutches   A copy of your complete advanced directive/DHPOA.    Please do NOT bring with you to surgery:   All jewelry and valuables should be left at home.   Prosthetic devices such as contact lenses, hearing aids, dentures, eyelash extensions, hairpins and body piercings must be removed prior to going in to the surgical suite.    After outpatient surgery:   A responsible adult MUST accompany you at the time of discharge and stay with you for 24 hours after your surgery. You may NOT drive yourself home after surgery.    Do not drive, operate machinery, make critical decisions or do activities that require co-ordination or balance until after a night’s sleep.   Do not drink alcoholic beverages for 24 hours.   Instructions for resuming your medications will be provided by your surgeon.    CALL YOUR DOCTOR AFTER SURGERY IF YOU HAVE:     Chills and/or a fever of 101° F or higher.    Redness, swelling, pus or drainage from your surgical wound or a bad smell from the wound.    Lightheadedness, fainting or confusion.    Persistent vomiting (throwing up) and are not able to eat or drink for 12 hours.    Three or more loose, watery bowel movements in 24 hours (diarrhea).   Difficulty or pain while urinating( after non-urological surgery)    Pain and swelling in your legs, especially if it is only on one side.    Difficulty breathing or are breathing faster than normal.    Any new concerning symptoms.

## 2025-01-23 NOTE — CPM/PAT H&P
CPM/PAT Evaluation       Name: Tyler Hernandez (Tyler Hernandez)  /Age: 1975/49 y.o.     In-Person       Chief Complaint: EGD    HPI: Tyler Hernandez is a 49 year old male with a history of ETOH abuse, hepatic steatosis, and liver cirrhosis . Patient states he is down to 5 beers a day. He denies any vomiting with signs of blood. He denies abdominal pain. He states he had nausea and chills this past weekend that has subsided. He states he is scheduled for and EGD with possible banding. He denies fever, chest pain, sob, dizziness, and palpitations.     Past Medical History:   Diagnosis Date    Anxiety     Depression     ETOH abuse     Liver disease, alcoholic (Multi)     Thrombocytopenia (CMS-HCC)     Umbilical hernia        Past Surgical History:   Procedure Laterality Date    HERNIA REPAIR         Patient  has no history on file for sexual activity.    No family history on file.    Allergies   Allergen Reactions    Tetracyclines Other     Current Outpatient Medications   Medication Sig Dispense Refill    ferrous sulfate, 325 mg ferrous sulfate, tablet Take 1 tablet (325 mg) by mouth once daily.      omeprazole (PriLOSEC) 40 mg DR capsule Take 1 capsule (40 mg) by mouth once daily.      acetaminophen (Tylenol) 500 mg tablet Take 2 tablets (1,000 mg) by mouth every 6 hours if needed for mild pain (1 - 3).      magnesium oxide (Mag-Ox) 400 mg tablet Take 1 tablet (400 mg) by mouth once daily.      multivitamin with minerals tablet Take 1 tablet by mouth once daily.      potassium gluconate 595 mg (99 mg) tablet Take 1 tablet by mouth once daily.       No current facility-administered medications for this visit.       Review of Systems   Constitutional: Negative.    HENT: Negative.     Eyes: Negative.    Respiratory: Negative.     Cardiovascular: Negative.    Gastrointestinal:  Positive for abdominal distention.   Endocrine: Negative.    Genitourinary: Negative.    Musculoskeletal: Negative.    Skin: Negative.   "  Allergic/Immunologic: Negative.    Neurological: Negative.    Hematological: Negative.    Psychiatric/Behavioral: Negative.             Physical Exam  Vitals reviewed.   Constitutional:       Appearance: Normal appearance.   HENT:      Head: Normocephalic and atraumatic.      Nose: Nose normal.      Mouth/Throat:      Mouth: Mucous membranes are moist.      Pharynx: Oropharynx is clear.   Eyes:      Extraocular Movements: Extraocular movements intact.      Conjunctiva/sclera: Conjunctivae normal.   Cardiovascular:      Rate and Rhythm: Normal rate and regular rhythm.      Pulses: Normal pulses.      Heart sounds: Normal heart sounds.   Pulmonary:      Effort: Pulmonary effort is normal.      Breath sounds: Normal breath sounds.   Abdominal:      General: Bowel sounds are normal.      Palpations: Abdomen is soft.   Genitourinary:     Comments: Assessment deferred to physician  Musculoskeletal:         General: Normal range of motion.      Cervical back: Normal range of motion and neck supple.   Skin:     General: Skin is warm and dry.   Neurological:      General: No focal deficit present.      Mental Status: He is alert and oriented to person, place, and time.   Psychiatric:         Mood and Affect: Mood normal.         Behavior: Behavior normal.         Thought Content: Thought content normal.         Judgment: Judgment normal.          PAT AIRWAY:   Airway:     Mallampati::  II    TM distance::  >3 FB    Neck ROM::  Full  normal          Visit Vitals  /73   Pulse 83   Temp 37 °C (98.6 °F) (Temporal)   Ht 1.88 m (6' 2\")   Wt 90.3 kg (199 lb)   SpO2 98%   BMI 25.55 kg/m²   Smoking Status Every Day   BSA 2.17 m²     ASA:II  CHADS2:1.9%  RCRI:0.4%  DASI:50.7  METS:9  STOP BAN        Assessment and Plan:     ETOH, esophageal varices: EGD  Hepatic steatosis/liver cirrhosis  Thrombocytopenia  Anxiety/depression  BMI: 25.55    LABS 24 reviewed  EKG 10/9/24 reviewed  TRISTIAN Soto-ISAEL          "

## 2025-01-30 ENCOUNTER — HOSPITAL ENCOUNTER (OUTPATIENT)
Dept: GASTROENTEROLOGY | Facility: HOSPITAL | Age: 50
Discharge: HOME | End: 2025-01-30
Payer: COMMERCIAL

## 2025-01-30 ENCOUNTER — ANESTHESIA EVENT (OUTPATIENT)
Dept: GASTROENTEROLOGY | Facility: HOSPITAL | Age: 50
End: 2025-01-30
Payer: COMMERCIAL

## 2025-01-30 ENCOUNTER — ANESTHESIA (OUTPATIENT)
Dept: GASTROENTEROLOGY | Facility: HOSPITAL | Age: 50
End: 2025-01-30
Payer: COMMERCIAL

## 2025-01-30 VITALS
HEART RATE: 72 BPM | DIASTOLIC BLOOD PRESSURE: 85 MMHG | SYSTOLIC BLOOD PRESSURE: 139 MMHG | RESPIRATION RATE: 16 BRPM | TEMPERATURE: 98.1 F | OXYGEN SATURATION: 100 %

## 2025-01-30 DIAGNOSIS — I85.00 ESOPHAGEAL VARICES WITHOUT BLEEDING, UNSPECIFIED ESOPHAGEAL VARICES TYPE (MULTI): ICD-10-CM

## 2025-01-30 PROBLEM — F32.A DEPRESSION: Status: ACTIVE | Noted: 2025-01-30

## 2025-01-30 PROBLEM — K76.9 LIVER DISEASE: Status: ACTIVE | Noted: 2025-01-30

## 2025-01-30 PROBLEM — F41.9 ANXIETY: Status: ACTIVE | Noted: 2025-01-30

## 2025-01-30 PROBLEM — K74.60 CIRRHOSIS (MULTI): Status: ACTIVE | Noted: 2025-01-30

## 2025-01-30 PROCEDURE — 7100000009 HC PHASE TWO TIME - INITIAL BASE CHARGE

## 2025-01-30 PROCEDURE — 7100000001 HC RECOVERY ROOM TIME - INITIAL BASE CHARGE

## 2025-01-30 PROCEDURE — 7100000002 HC RECOVERY ROOM TIME - EACH INCREMENTAL 1 MINUTE

## 2025-01-30 PROCEDURE — A43244 PR EDG BAND LIGATION ESOPHGEAL/GASTRIC VARICES: Performed by: STUDENT IN AN ORGANIZED HEALTH CARE EDUCATION/TRAINING PROGRAM

## 2025-01-30 PROCEDURE — 7100000010 HC PHASE TWO TIME - EACH INCREMENTAL 1 MINUTE

## 2025-01-30 PROCEDURE — A43244 PR EDG BAND LIGATION ESOPHGEAL/GASTRIC VARICES: Performed by: ANESTHESIOLOGIST ASSISTANT

## 2025-01-30 PROCEDURE — 3700000002 HC GENERAL ANESTHESIA TIME - EACH INCREMENTAL 1 MINUTE

## 2025-01-30 PROCEDURE — 43244 EGD VARICES LIGATION: CPT | Performed by: INTERNAL MEDICINE

## 2025-01-30 PROCEDURE — 2500000004 HC RX 250 GENERAL PHARMACY W/ HCPCS (ALT 636 FOR OP/ED): Performed by: ANESTHESIOLOGIST ASSISTANT

## 2025-01-30 PROCEDURE — 3700000001 HC GENERAL ANESTHESIA TIME - INITIAL BASE CHARGE

## 2025-01-30 PROCEDURE — 2720000007 HC OR 272 NO HCPCS

## 2025-01-30 RX ORDER — ONDANSETRON HYDROCHLORIDE 2 MG/ML
4 INJECTION, SOLUTION INTRAVENOUS ONCE AS NEEDED
Status: ACTIVE | OUTPATIENT
Start: 2025-01-30 | End: 2025-01-30

## 2025-01-30 RX ORDER — GLYCOPYRROLATE 0.2 MG/ML
INJECTION INTRAMUSCULAR; INTRAVENOUS AS NEEDED
Status: DISCONTINUED | OUTPATIENT
Start: 2025-01-30 | End: 2025-01-30

## 2025-01-30 RX ORDER — HYDROMORPHONE HYDROCHLORIDE 0.2 MG/ML
0.2 INJECTION INTRAMUSCULAR; INTRAVENOUS; SUBCUTANEOUS EVERY 5 MIN PRN
Status: ACTIVE | OUTPATIENT
Start: 2025-01-30 | End: 2025-01-30

## 2025-01-30 RX ORDER — MIDAZOLAM HYDROCHLORIDE 1 MG/ML
INJECTION, SOLUTION INTRAMUSCULAR; INTRAVENOUS AS NEEDED
Status: DISCONTINUED | OUTPATIENT
Start: 2025-01-30 | End: 2025-01-30

## 2025-01-30 RX ORDER — ALBUTEROL SULFATE 0.83 MG/ML
2.5 SOLUTION RESPIRATORY (INHALATION) EVERY 30 MIN PRN
Status: ACTIVE | OUTPATIENT
Start: 2025-01-30 | End: 2025-01-30

## 2025-01-30 RX ORDER — LABETALOL HYDROCHLORIDE 5 MG/ML
5 INJECTION, SOLUTION INTRAVENOUS EVERY 5 MIN PRN
Status: DISPENSED | OUTPATIENT
Start: 2025-01-30 | End: 2025-01-30

## 2025-01-30 RX ORDER — FENTANYL CITRATE 50 UG/ML
INJECTION, SOLUTION INTRAMUSCULAR; INTRAVENOUS AS NEEDED
Status: DISCONTINUED | OUTPATIENT
Start: 2025-01-30 | End: 2025-01-30

## 2025-01-30 RX ORDER — FENTANYL CITRATE 50 UG/ML
50 INJECTION, SOLUTION INTRAMUSCULAR; INTRAVENOUS EVERY 5 MIN PRN
Status: ACTIVE | OUTPATIENT
Start: 2025-01-30 | End: 2025-01-30

## 2025-01-30 RX ORDER — IPRATROPIUM BROMIDE 0.5 MG/2.5ML
500 SOLUTION RESPIRATORY (INHALATION) EVERY 30 MIN PRN
Status: ACTIVE | OUTPATIENT
Start: 2025-01-30 | End: 2025-01-30

## 2025-01-30 RX ORDER — SODIUM CHLORIDE, SODIUM LACTATE, POTASSIUM CHLORIDE, CALCIUM CHLORIDE 600; 310; 30; 20 MG/100ML; MG/100ML; MG/100ML; MG/100ML
40 INJECTION, SOLUTION INTRAVENOUS CONTINUOUS
Status: DISCONTINUED | OUTPATIENT
Start: 2025-01-30 | End: 2025-01-31 | Stop reason: HOSPADM

## 2025-01-30 RX ORDER — PROPOFOL 10 MG/ML
INJECTION, EMULSION INTRAVENOUS AS NEEDED
Status: DISCONTINUED | OUTPATIENT
Start: 2025-01-30 | End: 2025-01-30

## 2025-01-30 RX ADMIN — GLYCOPYRROLATE 0.3 MG: 0.2 INJECTION INTRAMUSCULAR; INTRAVENOUS at 08:30

## 2025-01-30 RX ADMIN — PROPOFOL 100 MG: 10 INJECTION, EMULSION INTRAVENOUS at 08:40

## 2025-01-30 RX ADMIN — FENTANYL CITRATE 100 MCG: 50 INJECTION, SOLUTION INTRAMUSCULAR; INTRAVENOUS at 08:37

## 2025-01-30 RX ADMIN — MIDAZOLAM 4 MG: 1 INJECTION INTRAMUSCULAR; INTRAVENOUS at 08:37

## 2025-01-30 RX ADMIN — SODIUM CHLORIDE, POTASSIUM CHLORIDE, SODIUM LACTATE AND CALCIUM CHLORIDE: 600; 310; 30; 20 INJECTION, SOLUTION INTRAVENOUS at 08:35

## 2025-01-30 RX ADMIN — PROPOFOL 50 MG: 10 INJECTION, EMULSION INTRAVENOUS at 08:43

## 2025-01-30 SDOH — HEALTH STABILITY: MENTAL HEALTH: CURRENT SMOKER: 0

## 2025-01-30 ASSESSMENT — PAIN SCALES - GENERAL
PAINLEVEL_OUTOF10: 0 - NO PAIN

## 2025-01-30 ASSESSMENT — PAIN - FUNCTIONAL ASSESSMENT
PAIN_FUNCTIONAL_ASSESSMENT: 0-10

## 2025-01-30 NOTE — DISCHARGE INSTRUCTIONS
Instructions  Patient Instructions after a Colonoscopy, Upper GI, and ERCP      The anesthetics, sedatives or narcotics which were given to you today will be acting in your body for the next 24 hours, so you might feel a little sleepy or groggy.  This feeling should slowly wear off. Carefully read and follow the instructions.      You received sedation today:  - Do not drive or operate any machinery or power tools of any kind.   - No alcoholic beverages today, not even beer or wine.  - Do not make any important decisions or sign any legal documents.  - No over the counter medications that contain alcohol or that may cause drowsiness.  - Do not make any important decisions or sign any legal documents.     While it is common to experience mild to moderate abdominal distention, gas, or belching after your procedure, if any of these symptoms occur following discharge from the GI Lab or within one week of having your procedure, call the [facility] to be advised whether a visit to your nearest Urgent Care or Emergency Department is indicated.  Take this paper with you if you go.      - If you develop an allergic reaction to the medications that were given during your procedure such as difficulty breathing, rash, hives, severe nausea, vomiting or lightheadedness.- If you experience chest pain, shortness of breath, severe abdominal pain, fevers and chills.     -If you develop signs and symptoms of bleeding such as blood in your spit, if your stools turn black, tarry, or bloody     - If you have not urinated within 8 hours following your procedure.- If your IV site becomes painful, red, inflamed, or looks infected.     If you received a biopsy/polypectomy/sphincterotomy the following instructions apply below:     - Do not use Aspirin containing products, non-steroidal medications or anti-coagulants for one week following your procedure. (Examples of these types of medications are: Advil, Arthrotec, Aleve,  Coumadin, Ecotrin, Heparin, Ibuprofen, Indocin, Motrin, Naprosyn, Nuprin, Plavix, Vioxx, and Voltarin, or their generic forms.  This list is not all-inclusive.  Check with your physician or pharmacist before resuming medications.)      - Eat a soft diet today.  Avoid foods that are poorly digested for the next 24 hours.  These foods would include: nuts, beans, lettuce, red meats, and fried foods.       - Start with liquids and advance your diet as tolerated, gradually work up to eating solids.      - Do not have a Barium Study or Enema for one week.     Your physician recommends the additional following instructions:     Colonoscopy: Resume your previous diet, continue your present medications, a repeated colonoscopy will be determined based on pathology result. Return to normal activity tomorrow.      Upper GI endoscopy: Resume your previous diet, continue your medications, recommendation to repeat upper endoscopy in three months for follow up of Patel's ablation. Return to normal activity tomorrow.

## 2025-01-30 NOTE — PERIOPERATIVE NURSING NOTE
0849: Pt. To PACU [place] [facility], report received, anesthesia at bedside, VSS, no s/s of pain/respiratory distress. Oral airway in place

## 2025-01-30 NOTE — ANESTHESIA POSTPROCEDURE EVALUATION
Patient: Tyler Hernandez    Procedure Summary       Date: 01/30/25 Room / Location: [facility]    Anesthesia Start: 0835 Anesthesia Stop: 0851    Procedure: EGD Diagnosis: Esophageal varices without bleeding, unspecified esophageal varices type (Multi)    Scheduled Providers: Anthony Allen MD; Sage Sullivan DO; FELICIANO Tang Responsible Provider: Sage Sullivan DO    Anesthesia Type: MAC ASA Status: 3            Anesthesia Type: MAC    Vitals Value Taken Time   /90 01/30/25 0910   Temp 36.2 °C (97.2 °F) 01/30/25 0849   Pulse 100 01/30/25 0910   Resp 16 01/30/25 0910   SpO2 96 % 01/30/25 0910       Anesthesia Post Evaluation    Patient location during evaluation: bedside  Patient participation: complete - patient participated  Level of consciousness: awake and alert  Pain management: adequate  Multimodal analgesia pain management approach  Airway patency: patent  Two or more strategies used to mitigate risk of obstructive sleep apnea  Cardiovascular status: acceptable  Respiratory status: acceptable  Hydration status: acceptable  Postoperative Nausea and Vomiting: none        No notable events documented.    
yes

## 2025-01-30 NOTE — NURSING NOTE
Pt back in [facility] from pacu, a/o x3, denies pain no s/s of bleeding. Gave him gingerale and family called to bedside

## 2025-01-30 NOTE — ANESTHESIA PREPROCEDURE EVALUATION
Patient: Tyler Hernandez    Procedure Information       Date/Time: 01/30/25 0830    Scheduled providers: Anthony Allen MD; Sage Sullivan DO; FELICIANO Tang    Procedure: EGD    Location: [facility]          Past Medical History:   Diagnosis Date    Anxiety     Depression     ETOH abuse     Liver disease, alcoholic (Multi)     Thrombocytopenia (CMS-HCC)     Umbilical hernia      Past Surgical History:   Procedure Laterality Date    HERNIA REPAIR         Relevant Problems   Anesthesia (within normal limits)      Neuro  ETOH abuse   (+) Anxiety   (+) Depression      Liver   (+) Cirrhosis (Multi)   (+) Liver disease       Clinical information reviewed:                   NPO Detail:  No data recorded     Physical Exam    Airway  Mallampati: II  TM distance: >3 FB  Neck ROM: full     Cardiovascular   Comments: deferred   Dental   Comments: No loose teeth   Pulmonary   Comments: deferred   Abdominal     Comments: deferred           Anesthesia Plan    History of general anesthesia?: yes  History of complications of general anesthesia?: no    ASA 3     MAC     The patient is not a current smoker.  Patient was not previously instructed to abstain from smoking on day of procedure.  Patient did not smoke on day of procedure.  Education provided regarding risk of obstructive sleep apnea.  intravenous induction   Postoperative administration of opioids is intended.  Anesthetic plan and risks discussed with patient.  Use of blood products discussed with patient who consented to blood products.    Plan discussed with CRNA and FELICIANO.

## 2025-03-19 ENCOUNTER — ANESTHESIA (OUTPATIENT)
Dept: GASTROENTEROLOGY | Facility: HOSPITAL | Age: 50
End: 2025-03-19
Payer: COMMERCIAL

## 2025-03-19 ENCOUNTER — HOSPITAL ENCOUNTER (OUTPATIENT)
Dept: GASTROENTEROLOGY | Facility: HOSPITAL | Age: 50
Discharge: HOME | End: 2025-03-19
Payer: COMMERCIAL

## 2025-03-19 ENCOUNTER — ANESTHESIA EVENT (OUTPATIENT)
Dept: GASTROENTEROLOGY | Facility: HOSPITAL | Age: 50
End: 2025-03-19
Payer: COMMERCIAL

## 2025-03-19 VITALS
TEMPERATURE: 96.8 F | SYSTOLIC BLOOD PRESSURE: 132 MMHG | RESPIRATION RATE: 14 BRPM | DIASTOLIC BLOOD PRESSURE: 84 MMHG | HEART RATE: 76 BPM | OXYGEN SATURATION: 98 %

## 2025-03-19 DIAGNOSIS — I85.00 ESOPHAGEAL VARICES WITHOUT BLEEDING, UNSPECIFIED ESOPHAGEAL VARICES TYPE (MULTI): ICD-10-CM

## 2025-03-19 PROCEDURE — 2500000004 HC RX 250 GENERAL PHARMACY W/ HCPCS (ALT 636 FOR OP/ED): Performed by: NURSE ANESTHETIST, CERTIFIED REGISTERED

## 2025-03-19 PROCEDURE — A43244 PR EDG BAND LIGATION ESOPHGEAL/GASTRIC VARICES: Performed by: NURSE ANESTHETIST, CERTIFIED REGISTERED

## 2025-03-19 PROCEDURE — A43244 PR EDG BAND LIGATION ESOPHGEAL/GASTRIC VARICES: Performed by: ANESTHESIOLOGY

## 2025-03-19 PROCEDURE — 7100000001 HC RECOVERY ROOM TIME - INITIAL BASE CHARGE

## 2025-03-19 PROCEDURE — 43244 EGD VARICES LIGATION: CPT | Performed by: INTERNAL MEDICINE

## 2025-03-19 PROCEDURE — 7100000010 HC PHASE TWO TIME - EACH INCREMENTAL 1 MINUTE

## 2025-03-19 PROCEDURE — 2500000004 HC RX 250 GENERAL PHARMACY W/ HCPCS (ALT 636 FOR OP/ED): Mod: JZ | Performed by: ANESTHESIOLOGY

## 2025-03-19 PROCEDURE — 2720000007 HC OR 272 NO HCPCS

## 2025-03-19 PROCEDURE — 7100000002 HC RECOVERY ROOM TIME - EACH INCREMENTAL 1 MINUTE

## 2025-03-19 PROCEDURE — 3700000001 HC GENERAL ANESTHESIA TIME - INITIAL BASE CHARGE

## 2025-03-19 PROCEDURE — 7100000009 HC PHASE TWO TIME - INITIAL BASE CHARGE

## 2025-03-19 PROCEDURE — 3700000002 HC GENERAL ANESTHESIA TIME - EACH INCREMENTAL 1 MINUTE

## 2025-03-19 RX ORDER — FENTANYL CITRATE 50 UG/ML
50 INJECTION, SOLUTION INTRAMUSCULAR; INTRAVENOUS EVERY 5 MIN PRN
Status: DISCONTINUED | OUTPATIENT
Start: 2025-03-19 | End: 2025-03-20 | Stop reason: HOSPADM

## 2025-03-19 RX ORDER — HYDROMORPHONE HYDROCHLORIDE 0.2 MG/ML
0.2 INJECTION INTRAMUSCULAR; INTRAVENOUS; SUBCUTANEOUS EVERY 5 MIN PRN
Status: DISCONTINUED | OUTPATIENT
Start: 2025-03-19 | End: 2025-03-20 | Stop reason: HOSPADM

## 2025-03-19 RX ORDER — ONDANSETRON HYDROCHLORIDE 2 MG/ML
4 INJECTION, SOLUTION INTRAVENOUS ONCE AS NEEDED
Status: DISCONTINUED | OUTPATIENT
Start: 2025-03-19 | End: 2025-03-20 | Stop reason: HOSPADM

## 2025-03-19 RX ORDER — MIDAZOLAM HYDROCHLORIDE 1 MG/ML
1 INJECTION, SOLUTION INTRAMUSCULAR; INTRAVENOUS ONCE AS NEEDED
Status: DISCONTINUED | OUTPATIENT
Start: 2025-03-19 | End: 2025-03-20 | Stop reason: HOSPADM

## 2025-03-19 RX ORDER — FENTANYL CITRATE 50 UG/ML
INJECTION, SOLUTION INTRAMUSCULAR; INTRAVENOUS AS NEEDED
Status: DISCONTINUED | OUTPATIENT
Start: 2025-03-19 | End: 2025-03-19

## 2025-03-19 RX ORDER — ALBUTEROL SULFATE 0.83 MG/ML
2.5 SOLUTION RESPIRATORY (INHALATION) ONCE
Status: DISCONTINUED | OUTPATIENT
Start: 2025-03-19 | End: 2025-03-20 | Stop reason: HOSPADM

## 2025-03-19 RX ORDER — LIDOCAINE HYDROCHLORIDE 10 MG/ML
0.1 INJECTION, SOLUTION INFILTRATION; PERINEURAL ONCE
Status: DISCONTINUED | OUTPATIENT
Start: 2025-03-19 | End: 2025-03-20 | Stop reason: HOSPADM

## 2025-03-19 RX ORDER — PROPOFOL 10 MG/ML
INJECTION, EMULSION INTRAVENOUS AS NEEDED
Status: DISCONTINUED | OUTPATIENT
Start: 2025-03-19 | End: 2025-03-19

## 2025-03-19 RX ORDER — MEPERIDINE HYDROCHLORIDE 25 MG/ML
12.5 INJECTION INTRAMUSCULAR; INTRAVENOUS; SUBCUTANEOUS EVERY 10 MIN PRN
Status: DISCONTINUED | OUTPATIENT
Start: 2025-03-19 | End: 2025-03-20 | Stop reason: HOSPADM

## 2025-03-19 RX ORDER — SODIUM CHLORIDE, SODIUM LACTATE, POTASSIUM CHLORIDE, CALCIUM CHLORIDE 600; 310; 30; 20 MG/100ML; MG/100ML; MG/100ML; MG/100ML
100 INJECTION, SOLUTION INTRAVENOUS CONTINUOUS
Status: DISCONTINUED | OUTPATIENT
Start: 2025-03-19 | End: 2025-03-20 | Stop reason: HOSPADM

## 2025-03-19 RX ORDER — LIDOCAINE HYDROCHLORIDE 10 MG/ML
INJECTION, SOLUTION INFILTRATION; PERINEURAL AS NEEDED
Status: DISCONTINUED | OUTPATIENT
Start: 2025-03-19 | End: 2025-03-19

## 2025-03-19 RX ADMIN — PROPOFOL 50 MG: 10 INJECTION, EMULSION INTRAVENOUS at 13:48

## 2025-03-19 RX ADMIN — LIDOCAINE HYDROCHLORIDE 50 ML: 10 INJECTION, SOLUTION INFILTRATION; PERINEURAL at 13:44

## 2025-03-19 RX ADMIN — PROPOFOL 50 MG: 10 INJECTION, EMULSION INTRAVENOUS at 13:45

## 2025-03-19 RX ADMIN — PROPOFOL 150 MG: 10 INJECTION, EMULSION INTRAVENOUS at 13:44

## 2025-03-19 RX ADMIN — FENTANYL CITRATE 50 MCG: 0.05 INJECTION, SOLUTION INTRAMUSCULAR; INTRAVENOUS at 13:44

## 2025-03-19 RX ADMIN — HYDROMORPHONE HYDROCHLORIDE 0.2 MG: 0.2 INJECTION, SOLUTION INTRAMUSCULAR; INTRAVENOUS; SUBCUTANEOUS at 14:10

## 2025-03-19 ASSESSMENT — PAIN - FUNCTIONAL ASSESSMENT
PAIN_FUNCTIONAL_ASSESSMENT: 0-10

## 2025-03-19 ASSESSMENT — COLUMBIA-SUICIDE SEVERITY RATING SCALE - C-SSRS
6. HAVE YOU EVER DONE ANYTHING, STARTED TO DO ANYTHING, OR PREPARED TO DO ANYTHING TO END YOUR LIFE?: NO
2. HAVE YOU ACTUALLY HAD ANY THOUGHTS OF KILLING YOURSELF?: NO
1. IN THE PAST MONTH, HAVE YOU WISHED YOU WERE DEAD OR WISHED YOU COULD GO TO SLEEP AND NOT WAKE UP?: NO

## 2025-03-19 ASSESSMENT — PAIN SCALES - GENERAL
PAINLEVEL_OUTOF10: 4
PAINLEVEL_OUTOF10: 5 - MODERATE PAIN
PAINLEVEL_OUTOF10: 4
PAINLEVEL_OUTOF10: 0 - NO PAIN
PAINLEVEL_OUTOF10: 4
PAINLEVEL_OUTOF10: 4

## 2025-03-19 ASSESSMENT — PAIN DESCRIPTION - DESCRIPTORS
DESCRIPTORS: BURNING;SQUEEZING
DESCRIPTORS: SQUEEZING

## 2025-03-19 NOTE — DISCHARGE INSTRUCTIONS
Oral lidocaine will be sent to home pharmacy.    Soft diet today.    Call for any questions or concerns.

## 2025-03-19 NOTE — H&P
History Of Present Illness  Tyler Hernandez is a 49 y.o. male here for repeat EGD with banding.  Has history of esophageal varices's with alcoholic cirrhosis.  Had banding before.  Cut down alcohol.  He is drinking 5-6 beers now.  Denies any chest pain nausea vomiting or GI bleeding.     Past Medical History  Past Medical History:   Diagnosis Date    Anxiety     Depression     ETOH abuse     Liver disease, alcoholic (Multi)     Thrombocytopenia (CMS-HCC)     Umbilical hernia        Surgical History  Past Surgical History:   Procedure Laterality Date    HERNIA REPAIR          Social History  He reports that he has been smoking cigarettes. He started smoking about 33 years ago. He has a 33.2 pack-year smoking history. He has never used smokeless tobacco. He reports current alcohol use of about 35.0 standard drinks of alcohol per week. He reports current drug use. Drug: Marijuana.    Family History  No family history on file.     Allergies  Tetracyclines    Review of Systems  No fever chills hemoptysis hematuria hallucination delusion.  No nausea vomiting.  Otherwise negative.  Physical Exam  Awake alert oriented x 3  Treated moist mucosa patient has eyeglasses  Neck is supple  Heart S1-S2 no murmur  Abdomen is soft not distended nontender bowel sounds present  Extremities have full range of motion  Last Recorded Vitals      Relevant Results    Hepatitis B&C negative    Immune to hepatitis A. CMP reviewed  Assessment/Plan   Assessment & Plan  Esophageal varices without bleeding, unspecified esophageal varices type (Multi)  Proceed with banding.  Advised to stop drinking completely.               Anthony Allen MD

## 2025-03-19 NOTE — ANESTHESIA PREPROCEDURE EVALUATION
Patient: Tyler Hernandez    Procedure Information       Date/Time: 03/19/25 1245    Scheduled providers: Anthony Allen MD    Procedure: EGD    Location: [facility]            Relevant Problems   Neuro   (+) Anxiety   (+) Depression      Liver   (+) Cirrhosis (Multi)   (+) Liver disease       Clinical information reviewed:   Tobacco  Allergies  Meds  Problems  Med Hx  Surg Hx   Fam Hx  Soc   Hx        NPO Detail:  NPO/Void Status  Date of Last Liquid: 03/18/25  Time of Last Liquid: 2300  Date of Last Solid: 03/18/25  Time of Last Solid: 2000  Time of Last Void: 1000         Physical Exam    Airway  Mallampati: II  TM distance: >3 FB     Cardiovascular    Dental    Pulmonary    Abdominal            Anesthesia Plan    History of general anesthesia?: yes  History of complications of general anesthesia?: no    ASA 3     MAC     intravenous induction   Anesthetic plan and risks discussed with patient.

## 2025-03-19 NOTE — POST-PROCEDURE NOTE
1441- pt brought back from pacu, verbal report received. Pt is alert and awake. SO at bedside.     1456- vss. Dc instructions given and explained to pt and SO. Both verbally understood.     1500- pt getting dressed at this time.     1505- pt taken down to main lobby via wheelchair with transport for dc.

## 2025-03-19 NOTE — ANESTHESIA POSTPROCEDURE EVALUATION
Patient: Tyler Hernandez    Procedure Summary       Date: 03/19/25 Room / Location: [facility]    Anesthesia Start: 1338 Anesthesia Stop: 1401    Procedure: EGD Diagnosis: Esophageal varices without bleeding, unspecified esophageal varices type (Multi)    Scheduled Providers: Anthony Allen MD; Kush Foster DO Responsible Provider: Kush Foster DO    Anesthesia Type: MAC ASA Status: 3            Anesthesia Type: MAC    Vitals Value Taken Time   /89 03/19/25 1441   Temp 36 °C (96.8 °F) 03/19/25 1440   Pulse 76 03/19/25 1441   Resp 16 03/19/25 1441   SpO2 95 % 03/19/25 1441   Vitals shown include unfiled device data.    Anesthesia Post Evaluation    Patient location during evaluation: bedside  Patient participation: complete - patient participated  Level of consciousness: awake  Pain management: adequate  Airway patency: patent  Cardiovascular status: acceptable  Respiratory status: acceptable  Hydration status: acceptable  Postoperative Nausea and Vomiting: none        There were no known notable events for this encounter.

## 2025-04-30 ENCOUNTER — ANESTHESIA EVENT (OUTPATIENT)
Dept: GASTROENTEROLOGY | Facility: HOSPITAL | Age: 50
End: 2025-04-30
Payer: COMMERCIAL

## 2025-04-30 ENCOUNTER — HOSPITAL ENCOUNTER (OUTPATIENT)
Dept: GASTROENTEROLOGY | Facility: HOSPITAL | Age: 50
Discharge: HOME | End: 2025-04-30
Payer: COMMERCIAL

## 2025-04-30 ENCOUNTER — ANESTHESIA (OUTPATIENT)
Dept: GASTROENTEROLOGY | Facility: HOSPITAL | Age: 50
End: 2025-04-30
Payer: COMMERCIAL

## 2025-04-30 VITALS
BODY MASS INDEX: 25.55 KG/M2 | SYSTOLIC BLOOD PRESSURE: 125 MMHG | DIASTOLIC BLOOD PRESSURE: 78 MMHG | HEART RATE: 73 BPM | RESPIRATION RATE: 17 BRPM | TEMPERATURE: 97.3 F | OXYGEN SATURATION: 96 % | WEIGHT: 199 LBS

## 2025-04-30 DIAGNOSIS — I85.00 ESOPHAGEAL VARICES WITHOUT BLEEDING, UNSPECIFIED ESOPHAGEAL VARICES TYPE (MULTI): ICD-10-CM

## 2025-04-30 PROCEDURE — A43244 PR EDG BAND LIGATION ESOPHGEAL/GASTRIC VARICES: Performed by: ANESTHESIOLOGY

## 2025-04-30 PROCEDURE — 2500000004 HC RX 250 GENERAL PHARMACY W/ HCPCS (ALT 636 FOR OP/ED): Mod: JZ | Performed by: NURSE ANESTHETIST, CERTIFIED REGISTERED

## 2025-04-30 PROCEDURE — 7100000010 HC PHASE TWO TIME - EACH INCREMENTAL 1 MINUTE

## 2025-04-30 PROCEDURE — 3700000002 HC GENERAL ANESTHESIA TIME - EACH INCREMENTAL 1 MINUTE

## 2025-04-30 PROCEDURE — 2720000007 HC OR 272 NO HCPCS

## 2025-04-30 PROCEDURE — A43244 PR EDG BAND LIGATION ESOPHGEAL/GASTRIC VARICES: Performed by: NURSE ANESTHETIST, CERTIFIED REGISTERED

## 2025-04-30 PROCEDURE — 3700000001 HC GENERAL ANESTHESIA TIME - INITIAL BASE CHARGE

## 2025-04-30 PROCEDURE — 7100000009 HC PHASE TWO TIME - INITIAL BASE CHARGE

## 2025-04-30 PROCEDURE — 7100000001 HC RECOVERY ROOM TIME - INITIAL BASE CHARGE

## 2025-04-30 PROCEDURE — 43244 EGD VARICES LIGATION: CPT | Performed by: INTERNAL MEDICINE

## 2025-04-30 PROCEDURE — 7100000002 HC RECOVERY ROOM TIME - EACH INCREMENTAL 1 MINUTE

## 2025-04-30 RX ORDER — MIDAZOLAM HYDROCHLORIDE 1 MG/ML
INJECTION, SOLUTION INTRAMUSCULAR; INTRAVENOUS AS NEEDED
Status: DISCONTINUED | OUTPATIENT
Start: 2025-04-30 | End: 2025-04-30

## 2025-04-30 RX ORDER — HYDROMORPHONE HYDROCHLORIDE 0.2 MG/ML
0.2 INJECTION INTRAMUSCULAR; INTRAVENOUS; SUBCUTANEOUS EVERY 5 MIN PRN
Status: DISCONTINUED | OUTPATIENT
Start: 2025-04-30 | End: 2025-05-01 | Stop reason: HOSPADM

## 2025-04-30 RX ORDER — MIDAZOLAM HYDROCHLORIDE 1 MG/ML
1 INJECTION, SOLUTION INTRAMUSCULAR; INTRAVENOUS ONCE AS NEEDED
Status: DISCONTINUED | OUTPATIENT
Start: 2025-04-30 | End: 2025-05-01 | Stop reason: HOSPADM

## 2025-04-30 RX ORDER — SODIUM CHLORIDE, SODIUM LACTATE, POTASSIUM CHLORIDE, CALCIUM CHLORIDE 600; 310; 30; 20 MG/100ML; MG/100ML; MG/100ML; MG/100ML
100 INJECTION, SOLUTION INTRAVENOUS CONTINUOUS
Status: DISCONTINUED | OUTPATIENT
Start: 2025-04-30 | End: 2025-05-01 | Stop reason: HOSPADM

## 2025-04-30 RX ORDER — ONDANSETRON HYDROCHLORIDE 2 MG/ML
4 INJECTION, SOLUTION INTRAVENOUS ONCE AS NEEDED
Status: DISCONTINUED | OUTPATIENT
Start: 2025-04-30 | End: 2025-05-01 | Stop reason: HOSPADM

## 2025-04-30 RX ORDER — PROPOFOL 10 MG/ML
INJECTION, EMULSION INTRAVENOUS AS NEEDED
Status: DISCONTINUED | OUTPATIENT
Start: 2025-04-30 | End: 2025-04-30

## 2025-04-30 RX ORDER — LIDOCAINE HYDROCHLORIDE 10 MG/ML
0.1 INJECTION, SOLUTION INFILTRATION; PERINEURAL ONCE
Status: DISCONTINUED | OUTPATIENT
Start: 2025-04-30 | End: 2025-05-01 | Stop reason: HOSPADM

## 2025-04-30 RX ORDER — ALBUTEROL SULFATE 0.83 MG/ML
2.5 SOLUTION RESPIRATORY (INHALATION) ONCE
Status: DISCONTINUED | OUTPATIENT
Start: 2025-04-30 | End: 2025-05-01 | Stop reason: HOSPADM

## 2025-04-30 RX ORDER — FENTANYL CITRATE 50 UG/ML
INJECTION, SOLUTION INTRAMUSCULAR; INTRAVENOUS AS NEEDED
Status: DISCONTINUED | OUTPATIENT
Start: 2025-04-30 | End: 2025-04-30

## 2025-04-30 RX ORDER — FENTANYL CITRATE 50 UG/ML
50 INJECTION, SOLUTION INTRAMUSCULAR; INTRAVENOUS EVERY 5 MIN PRN
Status: DISCONTINUED | OUTPATIENT
Start: 2025-04-30 | End: 2025-05-01 | Stop reason: HOSPADM

## 2025-04-30 RX ORDER — MEPERIDINE HYDROCHLORIDE 25 MG/ML
12.5 INJECTION INTRAMUSCULAR; INTRAVENOUS; SUBCUTANEOUS EVERY 10 MIN PRN
Status: DISCONTINUED | OUTPATIENT
Start: 2025-04-30 | End: 2025-05-01 | Stop reason: HOSPADM

## 2025-04-30 RX ADMIN — MIDAZOLAM 1 MG: 1 INJECTION INTRAMUSCULAR; INTRAVENOUS at 12:30

## 2025-04-30 RX ADMIN — PROPOFOL 120 MG: 10 INJECTION, EMULSION INTRAVENOUS at 12:28

## 2025-04-30 RX ADMIN — FENTANYL CITRATE 50 MCG: 50 INJECTION, SOLUTION INTRAMUSCULAR; INTRAVENOUS at 12:23

## 2025-04-30 RX ADMIN — SODIUM CHLORIDE, POTASSIUM CHLORIDE, SODIUM LACTATE AND CALCIUM CHLORIDE: 600; 310; 30; 20 INJECTION, SOLUTION INTRAVENOUS at 11:59

## 2025-04-30 RX ADMIN — MIDAZOLAM 1 MG: 1 INJECTION INTRAMUSCULAR; INTRAVENOUS at 12:25

## 2025-04-30 RX ADMIN — MIDAZOLAM 2 MG: 1 INJECTION INTRAMUSCULAR; INTRAVENOUS at 12:23

## 2025-04-30 RX ADMIN — PROPOFOL 80 MG: 10 INJECTION, EMULSION INTRAVENOUS at 12:26

## 2025-04-30 RX ADMIN — FENTANYL CITRATE 50 MCG: 50 INJECTION, SOLUTION INTRAMUSCULAR; INTRAVENOUS at 12:28

## 2025-04-30 ASSESSMENT — PAIN - FUNCTIONAL ASSESSMENT
PAIN_FUNCTIONAL_ASSESSMENT: 0-10
PAIN_FUNCTIONAL_ASSESSMENT: FLACC (FACE, LEGS, ACTIVITY, CRY, CONSOLABILITY)
PAIN_FUNCTIONAL_ASSESSMENT: 0-10

## 2025-04-30 ASSESSMENT — COLUMBIA-SUICIDE SEVERITY RATING SCALE - C-SSRS
6. HAVE YOU EVER DONE ANYTHING, STARTED TO DO ANYTHING, OR PREPARED TO DO ANYTHING TO END YOUR LIFE?: NO
1. IN THE PAST MONTH, HAVE YOU WISHED YOU WERE DEAD OR WISHED YOU COULD GO TO SLEEP AND NOT WAKE UP?: NO
2. HAVE YOU ACTUALLY HAD ANY THOUGHTS OF KILLING YOURSELF?: NO

## 2025-04-30 ASSESSMENT — PAIN DESCRIPTION - DESCRIPTORS
DESCRIPTORS: DISCOMFORT
DESCRIPTORS: DISCOMFORT

## 2025-04-30 ASSESSMENT — PAIN SCALES - GENERAL
PAINLEVEL_OUTOF10: 0 - NO PAIN

## 2025-04-30 NOTE — POST-PROCEDURE NOTE
Patient brought back to room from PACU.  Patient is alert and awake.  He has a little epigastric discomfort.  No pain.  Spouse at the bedside.  Patient tolerated hot tea.  Discharge instructions reviewed, verbalized understanding.  Dr. Allen was in to see patient.

## 2025-04-30 NOTE — H&P
History Of Present Illness  Tyler Hernandez is a 49 y.o. male presenting with colic cirrhosis and esophageal varices is here for EGD with banding.  Mild chest pain after last banding otherwise doing okay.  Cut on alcohol.  Denies any nausea vomiting.     Past Medical History  Medical History[1]    Surgical History  Surgical History[2]     Social History  He reports that he has been smoking cigarettes. He started smoking about 33 years ago. He has a 33.3 pack-year smoking history. He has never used smokeless tobacco. He reports current alcohol use of about 35.0 standard drinks of alcohol per week. He reports current drug use. Drug: Marijuana.    Family History  Family History[3]     Allergies  Tetracyclines    Review of Systems  .  No chills no hemoptysis no hallucination no delusion.     Physical Exam  Awake alert oriented x 3 comfortable  HEENT on moist mucosa patient has eyeglasses no icterus  Neck is supple  Lungs are clear  Heart S1-S2 no murmur   Abdominal soft nontender bowel sounds present  Extremities have full range of motion     Last Recorded Vitals  Blood pressure 122/75, pulse 72, temperature 37.2 °C (99 °F), temperature source Temporal, resp. rate 18, weight 90.3 kg (199 lb), SpO2 97%.    Relevant Results     Assessment & Plan  Esophageal varices without bleeding, unspecified esophageal varices type (Multi)    Proceed with EGD and banding.  Risks and benefit were explained to patient.  Continue current medications.          Anthony Allen MD         [1]   Past Medical History:  Diagnosis Date    Anxiety     Depression     ETOH abuse     Liver disease, alcoholic     Thrombocytopenia (CMS-HCC)     Umbilical hernia    [2]   Past Surgical History:  Procedure Laterality Date    HERNIA REPAIR     [3] No family history on file.

## 2025-04-30 NOTE — ANESTHESIA POSTPROCEDURE EVALUATION
Patient: Tyler Hernandez    Procedure Summary       Date: 04/30/25 Room / Location: [facility]    Anesthesia Start: 1204 Anesthesia Stop: 1243    Procedure: EGD Diagnosis: Esophageal varices without bleeding, unspecified esophageal varices type (Multi)    Scheduled Providers: Anthony Allen MD; Kush Foster DO; TRISTIAN Trujillo-CRNA Responsible Provider: Kush Foster DO    Anesthesia Type: MAC ASA Status: 3            Anesthesia Type: MAC    Vitals Value Taken Time   /86 04/30/25 13:05   Temp 36.3 °C (97.3 °F) 04/30/25 12:40   Pulse 81 04/30/25 13:09   Resp 14 04/30/25 13:09   SpO2 95 % 04/30/25 13:11   Vitals shown include unfiled device data.    Anesthesia Post Evaluation    Patient location during evaluation: bedside  Patient participation: complete - patient participated  Level of consciousness: awake  Pain management: adequate  Airway patency: patent  Cardiovascular status: acceptable  Respiratory status: acceptable  Hydration status: acceptable  Postoperative Nausea and Vomiting: none        There were no known notable events for this encounter.

## 2025-04-30 NOTE — ANESTHESIA PREPROCEDURE EVALUATION
Patient: Tyler Hernandez    Procedure Information       Date/Time: 04/30/25 1215    Scheduled providers: Anthony Allen MD; Kush Foster DO; TRISTIAN Trujillo-LYNNE    Procedure: EGD    Location: [facility]            Relevant Problems   Neuro   (+) Anxiety   (+) Depression      Liver   (+) Cirrhosis (Multi)   (+) Liver disease       Clinical information reviewed:   Tobacco  Allergies  Meds   Med Hx  Surg Hx   Fam Hx  Soc Hx        NPO Detail:  NPO/Void Status  Date of Last Liquid: 04/29/25  Time of Last Liquid: 2230  Date of Last Solid: 04/29/25  Time of Last Solid: 2230  Time of Last Void: 1000         Physical Exam    Airway  Mallampati: II  TM distance: >3 FB     Cardiovascular    Dental    Pulmonary    Abdominal            Anesthesia Plan    History of general anesthesia?: yes  History of complications of general anesthesia?: no    ASA 3     MAC     intravenous induction   Anesthetic plan and risks discussed with patient.

## 2025-05-01 ASSESSMENT — PAIN SCALES - GENERAL: PAINLEVEL_OUTOF10: 0 - NO PAIN

## 2025-06-06 ENCOUNTER — APPOINTMENT (OUTPATIENT)
Dept: GASTROENTEROLOGY | Facility: HOSPITAL | Age: 50
End: 2025-06-06
Payer: COMMERCIAL

## 2025-08-06 ENCOUNTER — ANESTHESIA EVENT (OUTPATIENT)
Dept: GASTROENTEROLOGY | Facility: HOSPITAL | Age: 50
End: 2025-08-06
Payer: COMMERCIAL

## 2025-08-06 ENCOUNTER — HOSPITAL ENCOUNTER (OUTPATIENT)
Dept: CARDIOLOGY | Facility: HOSPITAL | Age: 50
Discharge: HOME | End: 2025-08-06
Payer: COMMERCIAL

## 2025-08-06 ENCOUNTER — ANESTHESIA (OUTPATIENT)
Dept: GASTROENTEROLOGY | Facility: HOSPITAL | Age: 50
End: 2025-08-06
Payer: COMMERCIAL

## 2025-08-06 ENCOUNTER — HOSPITAL ENCOUNTER (OUTPATIENT)
Dept: GASTROENTEROLOGY | Facility: HOSPITAL | Age: 50
Discharge: HOME | End: 2025-08-06
Payer: COMMERCIAL

## 2025-08-06 VITALS
TEMPERATURE: 97.3 F | WEIGHT: 199 LBS | HEIGHT: 74 IN | DIASTOLIC BLOOD PRESSURE: 71 MMHG | HEART RATE: 69 BPM | SYSTOLIC BLOOD PRESSURE: 113 MMHG | RESPIRATION RATE: 18 BRPM | OXYGEN SATURATION: 96 % | BODY MASS INDEX: 25.54 KG/M2

## 2025-08-06 DIAGNOSIS — K76.9 LIVER DISEASE: Primary | ICD-10-CM

## 2025-08-06 DIAGNOSIS — I85.00 ESOPHAGEAL VARICES WITHOUT BLEEDING, UNSPECIFIED ESOPHAGEAL VARICES TYPE (MULTI): ICD-10-CM

## 2025-08-06 PROBLEM — D64.9 ANEMIA: Status: ACTIVE | Noted: 2025-08-06

## 2025-08-06 LAB
ALBUMIN SERPL BCP-MCNC: 3 G/DL (ref 3.4–5)
ALP SERPL-CCNC: 95 U/L (ref 33–120)
ALT SERPL W P-5'-P-CCNC: 56 U/L (ref 10–52)
ANION GAP SERPL CALCULATED.3IONS-SCNC: 11 MMOL/L (ref 10–20)
AST SERPL W P-5'-P-CCNC: 75 U/L (ref 9–39)
BASOPHILS # BLD AUTO: 0.05 X10*3/UL (ref 0–0.1)
BASOPHILS NFR BLD AUTO: 1.2 %
BILIRUB SERPL-MCNC: 2.1 MG/DL (ref 0–1.2)
BUN SERPL-MCNC: 9 MG/DL (ref 6–23)
CALCIUM SERPL-MCNC: 8.6 MG/DL (ref 8.6–10.3)
CHLORIDE SERPL-SCNC: 106 MMOL/L (ref 98–107)
CO2 SERPL-SCNC: 24 MMOL/L (ref 21–32)
CREAT SERPL-MCNC: 0.73 MG/DL (ref 0.5–1.3)
EGFRCR SERPLBLD CKD-EPI 2021: >90 ML/MIN/1.73M*2
EOSINOPHIL # BLD AUTO: 0.08 X10*3/UL (ref 0–0.7)
EOSINOPHIL NFR BLD AUTO: 1.9 %
ERYTHROCYTE [DISTWIDTH] IN BLOOD BY AUTOMATED COUNT: 13.8 % (ref 11.5–14.5)
GLUCOSE SERPL-MCNC: 117 MG/DL (ref 74–99)
HCT VFR BLD AUTO: 38.1 % (ref 41–52)
HGB BLD-MCNC: 13.2 G/DL (ref 13.5–17.5)
IMM GRANULOCYTES # BLD AUTO: 0.01 X10*3/UL (ref 0–0.7)
IMM GRANULOCYTES NFR BLD AUTO: 0.2 % (ref 0–0.9)
INR PPP: 1.3 (ref 0.9–1.2)
LYMPHOCYTES # BLD AUTO: 1.32 X10*3/UL (ref 1.2–4.8)
LYMPHOCYTES NFR BLD AUTO: 31.9 %
MCH RBC QN AUTO: 33.1 PG (ref 26–34)
MCHC RBC AUTO-ENTMCNC: 34.6 G/DL (ref 32–36)
MCV RBC AUTO: 96 FL (ref 80–100)
MONOCYTES # BLD AUTO: 0.33 X10*3/UL (ref 0.1–1)
MONOCYTES NFR BLD AUTO: 8 %
NEUTROPHILS # BLD AUTO: 2.35 X10*3/UL (ref 1.2–7.7)
NEUTROPHILS NFR BLD AUTO: 56.8 %
NRBC BLD-RTO: 0 /100 WBCS (ref 0–0)
PATH REVIEW-CBC DIFFERENTIAL: NORMAL
PLATELET # BLD AUTO: 48 X10*3/UL (ref 150–450)
POTASSIUM SERPL-SCNC: 3.6 MMOL/L (ref 3.5–5.3)
PROT SERPL-MCNC: 7.3 G/DL (ref 6.4–8.2)
PROTHROMBIN TIME: 14 SECONDS (ref 9.3–12.7)
RBC # BLD AUTO: 3.99 X10*6/UL (ref 4.5–5.9)
RBC MORPH BLD: NORMAL
SODIUM SERPL-SCNC: 137 MMOL/L (ref 136–145)
SPHEROCYTES BLD QL SMEAR: NORMAL
TARGETS BLD QL SMEAR: NORMAL
WBC # BLD AUTO: 4.1 X10*3/UL (ref 4.4–11.3)

## 2025-08-06 PROCEDURE — 43244 EGD VARICES LIGATION: CPT | Performed by: INTERNAL MEDICINE

## 2025-08-06 PROCEDURE — A43244 PR EDG BAND LIGATION ESOPHGEAL/GASTRIC VARICES: Performed by: ANESTHESIOLOGIST ASSISTANT

## 2025-08-06 PROCEDURE — 93005 ELECTROCARDIOGRAM TRACING: CPT | Mod: 59

## 2025-08-06 PROCEDURE — 2500000004 HC RX 250 GENERAL PHARMACY W/ HCPCS (ALT 636 FOR OP/ED): Performed by: ANESTHESIOLOGIST ASSISTANT

## 2025-08-06 PROCEDURE — A43244 PR EDG BAND LIGATION ESOPHGEAL/GASTRIC VARICES: Performed by: STUDENT IN AN ORGANIZED HEALTH CARE EDUCATION/TRAINING PROGRAM

## 2025-08-06 PROCEDURE — 2720000007 HC OR 272 NO HCPCS

## 2025-08-06 PROCEDURE — 7100000001 HC RECOVERY ROOM TIME - INITIAL BASE CHARGE

## 2025-08-06 PROCEDURE — 7100000009 HC PHASE TWO TIME - INITIAL BASE CHARGE

## 2025-08-06 PROCEDURE — 84075 ASSAY ALKALINE PHOSPHATASE: CPT | Performed by: INTERNAL MEDICINE

## 2025-08-06 PROCEDURE — 85025 COMPLETE CBC W/AUTO DIFF WBC: CPT | Performed by: INTERNAL MEDICINE

## 2025-08-06 PROCEDURE — 85610 PROTHROMBIN TIME: CPT | Performed by: INTERNAL MEDICINE

## 2025-08-06 PROCEDURE — 7100000010 HC PHASE TWO TIME - EACH INCREMENTAL 1 MINUTE

## 2025-08-06 PROCEDURE — 7100000002 HC RECOVERY ROOM TIME - EACH INCREMENTAL 1 MINUTE

## 2025-08-06 PROCEDURE — 3700000002 HC GENERAL ANESTHESIA TIME - EACH INCREMENTAL 1 MINUTE

## 2025-08-06 PROCEDURE — 3700000001 HC GENERAL ANESTHESIA TIME - INITIAL BASE CHARGE

## 2025-08-06 PROCEDURE — 36415 COLL VENOUS BLD VENIPUNCTURE: CPT | Performed by: INTERNAL MEDICINE

## 2025-08-06 RX ORDER — MIDAZOLAM HYDROCHLORIDE 1 MG/ML
INJECTION, SOLUTION INTRAMUSCULAR; INTRAVENOUS AS NEEDED
Status: DISCONTINUED | OUTPATIENT
Start: 2025-08-06 | End: 2025-08-06

## 2025-08-06 RX ORDER — SODIUM CHLORIDE, SODIUM LACTATE, POTASSIUM CHLORIDE, CALCIUM CHLORIDE 600; 310; 30; 20 MG/100ML; MG/100ML; MG/100ML; MG/100ML
INJECTION, SOLUTION INTRAVENOUS CONTINUOUS PRN
Status: DISCONTINUED | OUTPATIENT
Start: 2025-08-06 | End: 2025-08-06

## 2025-08-06 RX ORDER — ACETAMINOPHEN 325 MG/1
650 TABLET ORAL EVERY 4 HOURS PRN
Status: DISCONTINUED | OUTPATIENT
Start: 2025-08-06 | End: 2025-08-07 | Stop reason: HOSPADM

## 2025-08-06 RX ORDER — CARVEDILOL 3.12 MG/1
3.12 TABLET ORAL ONCE
Status: SHIPPED | OUTPATIENT
Start: 2025-08-06

## 2025-08-06 RX ORDER — SODIUM CHLORIDE, SODIUM LACTATE, POTASSIUM CHLORIDE, CALCIUM CHLORIDE 600; 310; 30; 20 MG/100ML; MG/100ML; MG/100ML; MG/100ML
100 INJECTION, SOLUTION INTRAVENOUS CONTINUOUS
Status: DISCONTINUED | OUTPATIENT
Start: 2025-08-06 | End: 2025-08-07 | Stop reason: HOSPADM

## 2025-08-06 RX ORDER — LIDOCAINE HYDROCHLORIDE 10 MG/ML
INJECTION, SOLUTION INTRAVENOUS AS NEEDED
Status: DISCONTINUED | OUTPATIENT
Start: 2025-08-06 | End: 2025-08-06

## 2025-08-06 RX ORDER — ONDANSETRON HYDROCHLORIDE 2 MG/ML
4 INJECTION, SOLUTION INTRAVENOUS ONCE AS NEEDED
Status: DISCONTINUED | OUTPATIENT
Start: 2025-08-06 | End: 2025-08-07 | Stop reason: HOSPADM

## 2025-08-06 RX ORDER — ALBUTEROL SULFATE 0.83 MG/ML
2.5 SOLUTION RESPIRATORY (INHALATION) ONCE AS NEEDED
Status: DISCONTINUED | OUTPATIENT
Start: 2025-08-06 | End: 2025-08-07 | Stop reason: HOSPADM

## 2025-08-06 RX ORDER — PROPOFOL 10 MG/ML
INJECTION, EMULSION INTRAVENOUS AS NEEDED
Status: DISCONTINUED | OUTPATIENT
Start: 2025-08-06 | End: 2025-08-06

## 2025-08-06 RX ADMIN — LIDOCAINE HYDROCHLORIDE 30 MG: 10 INJECTION, SOLUTION INTRAVENOUS at 13:44

## 2025-08-06 RX ADMIN — MIDAZOLAM 2 MG: 1 INJECTION INTRAMUSCULAR; INTRAVENOUS at 13:41

## 2025-08-06 RX ADMIN — PROPOFOL 80 MG: 10 INJECTION, EMULSION INTRAVENOUS at 13:44

## 2025-08-06 RX ADMIN — SODIUM CHLORIDE, POTASSIUM CHLORIDE, SODIUM LACTATE AND CALCIUM CHLORIDE: 600; 310; 30; 20 INJECTION, SOLUTION INTRAVENOUS at 13:39

## 2025-08-06 RX ADMIN — PROPOFOL 120 MG: 10 INJECTION, EMULSION INTRAVENOUS at 13:45

## 2025-08-06 SDOH — HEALTH STABILITY: MENTAL HEALTH: CURRENT SMOKER: 1

## 2025-08-06 ASSESSMENT — PAIN - FUNCTIONAL ASSESSMENT
PAIN_FUNCTIONAL_ASSESSMENT: 0-10

## 2025-08-06 ASSESSMENT — COLUMBIA-SUICIDE SEVERITY RATING SCALE - C-SSRS
2. HAVE YOU ACTUALLY HAD ANY THOUGHTS OF KILLING YOURSELF?: NO
6. HAVE YOU EVER DONE ANYTHING, STARTED TO DO ANYTHING, OR PREPARED TO DO ANYTHING TO END YOUR LIFE?: NO
1. IN THE PAST MONTH, HAVE YOU WISHED YOU WERE DEAD OR WISHED YOU COULD GO TO SLEEP AND NOT WAKE UP?: NO

## 2025-08-06 ASSESSMENT — PAIN SCALES - GENERAL
PAINLEVEL_OUTOF10: 0 - NO PAIN

## 2025-08-06 NOTE — ANESTHESIA POSTPROCEDURE EVALUATION
Patient: Tyler Hernandez    Procedure Summary       Date: 08/06/25 Room / Location: [facility]    Anesthesia Start: 1339 Anesthesia Stop: 1402    Procedure: EGD Diagnosis: Esophageal varices without bleeding, unspecified esophageal varices type (Multi)    Scheduled Providers: Anthony Allen MD; FELICIANO Singer; Hill Garcia DO Responsible Provider: Sage Sullivan DO    Anesthesia Type: MAC ASA Status: 3            Anesthesia Type: MAC    Vitals Value Taken Time   /85 08/06/25 14:26   Temp 36.3 °C (97.3 °F) 08/06/25 13:57   Pulse 81 08/06/25 14:28   Resp 20 08/06/25 14:28   SpO2 97 % 08/06/25 14:28   Vitals shown include unfiled device data.    Anesthesia Post Evaluation    Patient location during evaluation: bedside  Patient participation: complete - patient participated  Level of consciousness: awake and alert  Pain management: adequate  Multimodal analgesia pain management approach  Airway patency: patent  Two or more strategies used to mitigate risk of obstructive sleep apnea  Cardiovascular status: acceptable  Respiratory status: acceptable  Hydration status: acceptable  Postoperative Nausea and Vomiting: none        There were no known notable events for this encounter.

## 2025-08-06 NOTE — POST-PROCEDURE NOTE
Pt arrived to [facility] by RN. Snack provided and taken well. Home going instructions given to pt.Pt arrived to [facility] by RN. Snack provided and taken well. Home going instructions given to pt.

## 2025-08-06 NOTE — ANESTHESIA PREPROCEDURE EVALUATION
Patient: Tyler Hernandez    Procedure Information       Date/Time: 08/06/25 1400    Scheduled providers: Anthony Allen MD; FELICIANO Singer; Hill Garcia DO    Procedure: EGD    Location: [facility]            Relevant Problems   Anesthesia (within normal limits)      Cardiac (within normal limits)      Pulmonary (within normal limits)      Neuro   (+) Anxiety   (+) Depression      GI (within normal limits)      /Renal (within normal limits)      Liver   (+) Cirrhosis (Multi)   (+) Liver disease      Endocrine (within normal limits)      Hematology   (+) Anemia   (+) Thrombocytopenia      Musculoskeletal (within normal limits)      HEENT (within normal limits)      ID (within normal limits)      Skin (within normal limits)      GYN (within normal limits)      Mental Health   (+) ETOH abuse       Clinical information reviewed:   Tobacco   Meds   Med Hx  Surg Hx   Fam Hx          NPO Detail:  No data recorded     Physical Exam    Airway  Mallampati: I  TM distance: >3 FB  Neck ROM: full  Mouth opening: 3 or more finger widths     Cardiovascular    Dental - normal exam     Pulmonary    Abdominal            Anesthesia Plan    History of general anesthesia?: yes  History of complications of general anesthesia?: no    ASA 3     MAC     The patient is a current smoker.  Patient was not previously instructed to abstain from smoking on day of procedure.  Patient smoked on day of procedure.    intravenous induction   Anesthetic plan and risks discussed with patient.    Plan discussed with FELICIANO.

## 2025-08-06 NOTE — H&P
"History Of Present Illness  Tyler Hernandez is a 50 y.o. male presenting with colic cirrhosis and esophageal varices is here for EGD with banding.  Last EGD was done in April 2025.  He has decreased amount of alcohol intake.  Denies any abdominal pain nausea vomiting.     Past Medical History  Medical History[1]    Surgical History  Surgical History[2]     Social History  He reports that he has been smoking cigarettes. He started smoking about 33 years ago. He has a 33.6 pack-year smoking history. He has never used smokeless tobacco. He reports current alcohol use of about 35.0 standard drinks of alcohol per week. He reports current drug use. Drug: Marijuana.    Family History  Family History[3]     Allergies  Tetracyclines    Review of Systems  No chills no hemoptysis no hallucination no delusion no nausea vomiting otherwise negative.  Physical Exam  Awake alert oriented x 3  Slightly tired  HEENT moist mucosa  Neck Is Supple  Lungs are clear  Heart S1-S2 no murmur  Abdominal soft no guarding or rigidity.  Extremity full range of motion  Last Recorded Vitals  Blood pressure 133/79, pulse 80, temperature 37 °C (98.6 °F), temperature source Temporal, resp. rate 16, height 1.88 m (6' 2\"), weight 90.3 kg (199 lb), SpO2 98%.    Relevant Results         Assessment & Plan  Esophageal varices without bleeding, unspecified esophageal varices type (Multi)    Patient with EGD with banding.  Risks and benefits were explained to patient.  Anthony Allen MD         [1]   Past Medical History:  Diagnosis Date    Anxiety     Depression     ETOH abuse     Liver disease, alcoholic     Thrombocytopenia     Umbilical hernia    [2]   Past Surgical History:  Procedure Laterality Date    HERNIA REPAIR     [3] No family history on file.    "

## 2025-08-07 LAB
ATRIAL RATE: 72 BPM
P AXIS: 68 DEGREES
P OFFSET: 192 MS
P ONSET: 147 MS
PR INTERVAL: 152 MS
Q ONSET: 223 MS
QRS COUNT: 12 BEATS
QRS DURATION: 82 MS
QT INTERVAL: 388 MS
QTC CALCULATION(BAZETT): 424 MS
QTC FREDERICIA: 412 MS
R AXIS: -2 DEGREES
T AXIS: 18 DEGREES
T OFFSET: 417 MS
VENTRICULAR RATE: 72 BPM